# Patient Record
Sex: FEMALE | Race: WHITE | NOT HISPANIC OR LATINO | Employment: FULL TIME | URBAN - METROPOLITAN AREA
[De-identification: names, ages, dates, MRNs, and addresses within clinical notes are randomized per-mention and may not be internally consistent; named-entity substitution may affect disease eponyms.]

---

## 2019-07-22 ENCOUNTER — OFFICE VISIT (OUTPATIENT)
Dept: UROGYNECOLOGY | Facility: CLINIC | Age: 49
End: 2019-07-22
Payer: COMMERCIAL

## 2019-07-22 VITALS
DIASTOLIC BLOOD PRESSURE: 77 MMHG | HEART RATE: 74 BPM | WEIGHT: 173.31 LBS | BODY MASS INDEX: 27.85 KG/M2 | SYSTOLIC BLOOD PRESSURE: 114 MMHG | HEIGHT: 66 IN

## 2019-07-22 DIAGNOSIS — R35.0 URINARY FREQUENCY: Primary | ICD-10-CM

## 2019-07-22 DIAGNOSIS — T19.2XXA VAGINAL FOREIGN BODY, INITIAL ENCOUNTER: ICD-10-CM

## 2019-07-22 DIAGNOSIS — N39.42 URINARY INCONTINENCE WITHOUT SENSORY AWARENESS: ICD-10-CM

## 2019-07-22 DIAGNOSIS — N76.1 SUBACUTE VAGINITIS: ICD-10-CM

## 2019-07-22 LAB
BILIRUB SERPL-MCNC: NORMAL MG/DL
BLOOD URINE, POC: NORMAL
COLOR, POC UA: NORMAL
GLUCOSE UR QL STRIP: NORMAL
KETONES UR QL STRIP: NORMAL
LEUKOCYTE ESTERASE URINE, POC: NORMAL
NITRITE, POC UA: NORMAL
PH, POC UA: 7
PROTEIN, POC: NORMAL
SPECIFIC GRAVITY, POC UA: 1000
UROBILINOGEN, POC UA: NORMAL

## 2019-07-22 PROCEDURE — 99999 PR PBB SHADOW E&M-NEW PATIENT-LVL III: ICD-10-PCS | Mod: PBBFAC,,, | Performed by: OBSTETRICS & GYNECOLOGY

## 2019-07-22 PROCEDURE — 3008F BODY MASS INDEX DOCD: CPT | Mod: CPTII,S$GLB,, | Performed by: OBSTETRICS & GYNECOLOGY

## 2019-07-22 PROCEDURE — 3008F PR BODY MASS INDEX (BMI) DOCUMENTED: ICD-10-PCS | Mod: CPTII,S$GLB,, | Performed by: OBSTETRICS & GYNECOLOGY

## 2019-07-22 PROCEDURE — 81002 POCT URINE DIPSTICK WITHOUT MICROSCOPE: ICD-10-PCS | Mod: S$GLB,,, | Performed by: OBSTETRICS & GYNECOLOGY

## 2019-07-22 PROCEDURE — 99204 PR OFFICE/OUTPT VISIT, NEW, LEVL IV, 45-59 MIN: ICD-10-PCS | Mod: 25,S$GLB,, | Performed by: OBSTETRICS & GYNECOLOGY

## 2019-07-22 PROCEDURE — 99204 OFFICE O/P NEW MOD 45 MIN: CPT | Mod: 25,S$GLB,, | Performed by: OBSTETRICS & GYNECOLOGY

## 2019-07-22 PROCEDURE — 81002 URINALYSIS NONAUTO W/O SCOPE: CPT | Mod: S$GLB,,, | Performed by: OBSTETRICS & GYNECOLOGY

## 2019-07-22 PROCEDURE — 99999 PR PBB SHADOW E&M-NEW PATIENT-LVL III: CPT | Mod: PBBFAC,,, | Performed by: OBSTETRICS & GYNECOLOGY

## 2019-07-22 RX ORDER — CLOTRIMAZOLE AND BETAMETHASONE DIPROPIONATE 10; .64 MG/G; MG/G
CREAM TOPICAL
Refills: 0 | COMMUNITY
Start: 2019-05-25 | End: 2024-02-04

## 2019-07-22 RX ORDER — MELOXICAM 15 MG/1
TABLET ORAL
Refills: 0 | COMMUNITY
Start: 2019-05-30 | End: 2019-08-19

## 2019-07-22 RX ORDER — PREDNISONE 20 MG/1
TABLET ORAL
Refills: 0 | COMMUNITY
Start: 2019-05-30 | End: 2019-08-19

## 2019-07-22 RX ORDER — HYDROXYZINE HYDROCHLORIDE 25 MG/1
TABLET, FILM COATED ORAL
Refills: 0 | COMMUNITY
Start: 2019-05-30 | End: 2023-09-27

## 2019-07-22 NOTE — PROGRESS NOTES
Subjective:     Chief Complaint:  Odor, vaginal discharge, incontinence  History of Present Illness: Corinne Miller Wingerter is a 49 y.o. female who presents for incontinence fishy odor and discharge.  This is been going on for about 1 and half months.  She is changing pads about 3 times per day.  She describes more spontaneous incontinence and urge incontinence does not have any significant stress incontinence.  Her urinalysis today is normal.  She says she had incontinence during her last pregnancy and pressure on her bladder but it seemed to resolve post partially    Review of Systems    Constitutional: No acute distress. No weight gain/loss.  Eyes: No vision changes.  ENT: No headaches.   Respiratory: No SOB.  Cardiovascular: No chest pain. No edema. Hypertension  Gastrointestinal: No constipation. No diarrhea. No fecal incontinence.   Genitourinary: No vaginal bleeding or discharge.  Integument/Breast: Negative  Hematologic/Lymphatic: No history of anemia.  Musculoskeletal: No back pain. No abdominal pain.  Neurological: No disc problems. No paresthesias.  Behavioral/Psych: No history of depression.   Endocrine: No hormonal replacement.  Allergy/Immune: no recent reactions     Objective:   General Exam:  General appearance: WDNF. NAD.   HEENT: Rand. EOM's intact.  Neck: Normal thyroid.   Back: No CVA tenderness.  RESP: No SOB.  Breasts: deferred  Abdomen: Benign without localizing signs.  Extremities: No edema. No varices.  Lymphatic: noncontributory  Skin: No rashes. No lesions.  Neurologic: Intact.   Psych: Oriented.   Pelvic Exam:  V:  No lesions. No palpable nodes.   Va:  Malodorous grayish discharge.  Old tampon in posterior vault -removed with a ring forceps and Betadine applied  .Meatus:No caruncle or stenosis  Urethra: Non tender. No suburethral masses.  No significant hypermobility  Cx/Cuff:  Slightly inflamed  Uterus:  Mobile and nontender   Ad: No mass or tenderness.  Levators :Symmetrical. Normal  tone. Non tender.  BL: Non tender  RV: No hemorrhoids.  Assessment:   Retained tampon-symptoms a more of spontaneous incontinence and it is possible this is just a copious discharge from the tampon.  We will temporarily give her Myrbetriq and K she is developing overactive bladder       Plan:      Return for weeks or p.r.n..  If the discharge does not resolve within 24-48 hours she will call for a prescription for MetroGel

## 2019-08-19 ENCOUNTER — OFFICE VISIT (OUTPATIENT)
Dept: UROGYNECOLOGY | Facility: CLINIC | Age: 49
End: 2019-08-19
Payer: COMMERCIAL

## 2019-08-19 VITALS
WEIGHT: 169.75 LBS | HEIGHT: 66 IN | BODY MASS INDEX: 27.28 KG/M2 | SYSTOLIC BLOOD PRESSURE: 102 MMHG | DIASTOLIC BLOOD PRESSURE: 65 MMHG | HEART RATE: 65 BPM

## 2019-08-19 DIAGNOSIS — R35.0 URINARY FREQUENCY: Primary | ICD-10-CM

## 2019-08-19 LAB
BILIRUB SERPL-MCNC: NORMAL MG/DL
BLOOD URINE, POC: NORMAL
COLOR, POC UA: NORMAL
GLUCOSE UR QL STRIP: NORMAL
KETONES UR QL STRIP: NORMAL
LEUKOCYTE ESTERASE URINE, POC: NORMAL
NITRITE, POC UA: NORMAL
PH, POC UA: 7
PROTEIN, POC: NORMAL
SPECIFIC GRAVITY, POC UA: 1005
UROBILINOGEN, POC UA: NORMAL

## 2019-08-19 PROCEDURE — 99999 PR PBB SHADOW E&M-EST. PATIENT-LVL III: CPT | Mod: PBBFAC,,, | Performed by: OBSTETRICS & GYNECOLOGY

## 2019-08-19 PROCEDURE — 99213 OFFICE O/P EST LOW 20 MIN: CPT | Mod: PBBFAC,PO | Performed by: OBSTETRICS & GYNECOLOGY

## 2019-08-19 PROCEDURE — 99213 PR OFFICE/OUTPT VISIT, EST, LEVL III, 20-29 MIN: ICD-10-PCS | Mod: S$GLB,,, | Performed by: OBSTETRICS & GYNECOLOGY

## 2019-08-19 PROCEDURE — 99213 OFFICE O/P EST LOW 20 MIN: CPT | Mod: S$GLB,,, | Performed by: OBSTETRICS & GYNECOLOGY

## 2019-08-19 PROCEDURE — 81002 URINALYSIS NONAUTO W/O SCOPE: CPT | Mod: PBBFAC,PO | Performed by: OBSTETRICS & GYNECOLOGY

## 2019-08-19 PROCEDURE — 99999 PR PBB SHADOW E&M-EST. PATIENT-LVL III: ICD-10-PCS | Mod: PBBFAC,,, | Performed by: OBSTETRICS & GYNECOLOGY

## 2019-08-19 NOTE — PROGRESS NOTES
Subjective:     Chief Complaint:  Incontinence  History of Present Illness: Corinne Miller Wingerter is a 49 y.o. female who presents for follow-up of spontaneous and urge incontinence.  She had a retained tampon at the time.  She was placed on Myrbetriq and is having no further symptoms but she feels it was probably related to the tampon since it was fairly recent onset.  The malodorous discharge has discontinue.  She finished the Myrbetriq yesterday so she does not know yet how she feels off of it.    Review of Systems    Constitutional: No acute distress. No weight gain/loss.  Eyes: No vision changes.  ENT:  headaches.   Respiratory:  Nonsmoker  Cardiovascular: No chest pain. No edema.   Gastrointestinal: No constipation. No diarrhea. No fecal incontinence.   Genitourinary:  Pre menopausal  Integument/Breast: Negative  Hematologic/Lymphatic: No history of anemia.  Musculoskeletal:  Knee problems  Neurological: No disc problems. No paresthesias.  Behavioral/Psych: No history of depression.   Endocrine: No hormonal replacement.  Allergy/Immune: no recent reactions     Objective:   General Exam:  General appearance: WDNF. NAD.   HEENT: Rand. EOM's intact.  Neck: Normal thyroid.   Back: No CVA tenderness.  RESP: No SOB.  Breasts: deferred  Abdomen: Benign without localizing signs.  Extremities: No edema. No varices.  Lymphatic: noncontributory  Skin: No rashes. No lesions.  Neurologic: Intact.   Psych: Oriented.       Assessment:      Symptoms were likely secondary to the retained tampon-she is not presently having any incontinence    Plan:      She will go off of the Myrbetriq.  If her symptoms recur  she will call for a prescription

## 2019-10-25 ENCOUNTER — TELEPHONE (OUTPATIENT)
Dept: UROGYNECOLOGY | Facility: CLINIC | Age: 49
End: 2019-10-25

## 2019-10-25 NOTE — TELEPHONE ENCOUNTER
Spoke with pt, she was getting sample from dr posada and wanted to know if a rx could be called in by tuesday

## 2019-10-25 NOTE — TELEPHONE ENCOUNTER
----- Message from Nina Saravanan sent at 10/25/2019  3:20 PM CDT -----  Contact: pt  Type: Needs Medical Advice    Who Called:  pt    Best Call Back Number:   Additional Information: Requesting a call back from Nurse regarding a Rx being called in if pt bladder is leaking per ,please call with advice

## 2020-05-14 DIAGNOSIS — Z12.31 ENCOUNTER FOR SCREENING MAMMOGRAM FOR MALIGNANT NEOPLASM OF BREAST: Primary | ICD-10-CM

## 2020-05-18 ENCOUNTER — HOSPITAL ENCOUNTER (OUTPATIENT)
Dept: RADIOLOGY | Facility: HOSPITAL | Age: 50
Discharge: HOME | End: 2020-05-18
Attending: NURSE PRACTITIONER
Payer: COMMERCIAL

## 2020-05-18 VITALS — BODY MASS INDEX: 33.33 KG/M2 | HEIGHT: 60 IN | WEIGHT: 169.75 LBS

## 2020-05-18 DIAGNOSIS — Z12.31 ENCOUNTER FOR SCREENING MAMMOGRAM FOR MALIGNANT NEOPLASM OF BREAST: ICD-10-CM

## 2020-05-18 PROCEDURE — 77067 SCR MAMMO BI INCL CAD: CPT | Mod: TC,PO

## 2021-02-24 ENCOUNTER — OFFICE VISIT (OUTPATIENT)
Dept: URGENT CARE | Facility: CLINIC | Age: 51
End: 2021-02-24
Payer: COMMERCIAL

## 2021-02-24 VITALS
SYSTOLIC BLOOD PRESSURE: 122 MMHG | OXYGEN SATURATION: 99 % | TEMPERATURE: 99 F | DIASTOLIC BLOOD PRESSURE: 84 MMHG | HEART RATE: 100 BPM | RESPIRATION RATE: 20 BRPM

## 2021-02-24 DIAGNOSIS — R50.9 FEVER: ICD-10-CM

## 2021-02-24 DIAGNOSIS — M79.10 MUSCLE PAIN: Primary | ICD-10-CM

## 2021-02-24 DIAGNOSIS — R53.83 FATIGUE, UNSPECIFIED TYPE: ICD-10-CM

## 2021-02-24 PROCEDURE — 99204 OFFICE O/P NEW MOD 45 MIN: CPT | Mod: S$GLB,,, | Performed by: EMERGENCY MEDICINE

## 2021-02-24 PROCEDURE — U0003 INFECTIOUS AGENT DETECTION BY NUCLEIC ACID (DNA OR RNA); SEVERE ACUTE RESPIRATORY SYNDROME CORONAVIRUS 2 (SARS-COV-2) (CORONAVIRUS DISEASE [COVID-19]), AMPLIFIED PROBE TECHNIQUE, MAKING USE OF HIGH THROUGHPUT TECHNOLOGIES AS DESCRIBED BY CMS-2020-01-R: HCPCS

## 2021-02-24 PROCEDURE — 99204 PR OFFICE/OUTPT VISIT, NEW, LEVL IV, 45-59 MIN: ICD-10-PCS | Mod: S$GLB,,, | Performed by: EMERGENCY MEDICINE

## 2021-02-24 PROCEDURE — U0005 INFEC AGEN DETEC AMPLI PROBE: HCPCS

## 2021-02-25 DIAGNOSIS — U07.1 COVID-19 VIRUS DETECTED: ICD-10-CM

## 2021-02-25 LAB — SARS-COV-2 RNA RESP QL NAA+PROBE: DETECTED

## 2021-04-29 ENCOUNTER — PATIENT MESSAGE (OUTPATIENT)
Dept: RESEARCH | Facility: HOSPITAL | Age: 51
End: 2021-04-29

## 2021-07-22 ENCOUNTER — OFFICE VISIT (OUTPATIENT)
Dept: URGENT CARE | Facility: CLINIC | Age: 51
End: 2021-07-22
Payer: COMMERCIAL

## 2021-07-22 VITALS
TEMPERATURE: 98 F | DIASTOLIC BLOOD PRESSURE: 76 MMHG | RESPIRATION RATE: 16 BRPM | OXYGEN SATURATION: 98 % | HEART RATE: 61 BPM | SYSTOLIC BLOOD PRESSURE: 112 MMHG

## 2021-07-22 DIAGNOSIS — J02.9 SORE THROAT: ICD-10-CM

## 2021-07-22 DIAGNOSIS — Z20.822 COVID-19 VIRUS NOT DETECTED: Primary | ICD-10-CM

## 2021-07-22 LAB
CTP QC/QA: YES
SARS-COV-2 RDRP RESP QL NAA+PROBE: NEGATIVE

## 2021-07-22 PROCEDURE — U0002 COVID-19 LAB TEST NON-CDC: HCPCS | Mod: QW,S$GLB,, | Performed by: NURSE PRACTITIONER

## 2021-07-22 PROCEDURE — 99213 OFFICE O/P EST LOW 20 MIN: CPT | Mod: S$GLB,,, | Performed by: NURSE PRACTITIONER

## 2021-07-22 PROCEDURE — 99213 PR OFFICE/OUTPT VISIT, EST, LEVL III, 20-29 MIN: ICD-10-PCS | Mod: S$GLB,,, | Performed by: NURSE PRACTITIONER

## 2021-07-22 PROCEDURE — U0002: ICD-10-PCS | Mod: QW,S$GLB,, | Performed by: NURSE PRACTITIONER

## 2021-08-18 ENCOUNTER — HOSPITAL ENCOUNTER (OUTPATIENT)
Dept: RADIOLOGY | Facility: HOSPITAL | Age: 51
Discharge: HOME | End: 2021-08-18
Attending: PHYSICAL MEDICINE & REHABILITATION
Payer: COMMERCIAL

## 2021-08-18 ENCOUNTER — OFFICE VISIT (OUTPATIENT)
Dept: PHYSICAL MEDICINE AND REHAB | Facility: CLINIC | Age: 51
End: 2021-08-18
Payer: COMMERCIAL

## 2021-08-18 VITALS
HEIGHT: 60 IN | DIASTOLIC BLOOD PRESSURE: 77 MMHG | HEART RATE: 74 BPM | SYSTOLIC BLOOD PRESSURE: 127 MMHG | WEIGHT: 169 LBS | BODY MASS INDEX: 33.18 KG/M2

## 2021-08-18 DIAGNOSIS — R20.2 PARESTHESIAS: Primary | ICD-10-CM

## 2021-08-18 DIAGNOSIS — M54.9 DORSALGIA, UNSPECIFIED: ICD-10-CM

## 2021-08-18 DIAGNOSIS — R20.2 PARESTHESIAS: ICD-10-CM

## 2021-08-18 DIAGNOSIS — M25.559 HIP PAIN: ICD-10-CM

## 2021-08-18 PROCEDURE — 1125F AMNT PAIN NOTED PAIN PRSNT: CPT | Mod: CPTII,S$GLB,, | Performed by: PHYSICAL MEDICINE & REHABILITATION

## 2021-08-18 PROCEDURE — 73502 X-RAY EXAM HIP UNI 2-3 VIEWS: CPT | Mod: 26,RT,, | Performed by: RADIOLOGY

## 2021-08-18 PROCEDURE — 99204 PR OFFICE/OUTPT VISIT, NEW, LEVL IV, 45-59 MIN: ICD-10-PCS | Mod: S$GLB,,, | Performed by: PHYSICAL MEDICINE & REHABILITATION

## 2021-08-18 PROCEDURE — 72110 X-RAY EXAM L-2 SPINE 4/>VWS: CPT | Mod: 26,,, | Performed by: RADIOLOGY

## 2021-08-18 PROCEDURE — 99999 PR PBB SHADOW E&M-EST. PATIENT-LVL III: CPT | Mod: PBBFAC,,, | Performed by: PHYSICAL MEDICINE & REHABILITATION

## 2021-08-18 PROCEDURE — 99999 PR PBB SHADOW E&M-EST. PATIENT-LVL III: ICD-10-PCS | Mod: PBBFAC,,, | Performed by: PHYSICAL MEDICINE & REHABILITATION

## 2021-08-18 PROCEDURE — 72050 X-RAY EXAM NECK SPINE 4/5VWS: CPT | Mod: 26,,, | Performed by: RADIOLOGY

## 2021-08-18 PROCEDURE — 3008F PR BODY MASS INDEX (BMI) DOCUMENTED: ICD-10-PCS | Mod: CPTII,S$GLB,, | Performed by: PHYSICAL MEDICINE & REHABILITATION

## 2021-08-18 PROCEDURE — 73502 XR HIP WITH PELVIS WHEN PERFORMED, 2 OR 3  VIEWS RIGHT: ICD-10-PCS | Mod: 26,RT,, | Performed by: RADIOLOGY

## 2021-08-18 PROCEDURE — 3074F PR MOST RECENT SYSTOLIC BLOOD PRESSURE < 130 MM HG: ICD-10-PCS | Mod: CPTII,S$GLB,, | Performed by: PHYSICAL MEDICINE & REHABILITATION

## 2021-08-18 PROCEDURE — 73502 X-RAY EXAM HIP UNI 2-3 VIEWS: CPT | Mod: TC,FY,RT

## 2021-08-18 PROCEDURE — 72110 X-RAY EXAM L-2 SPINE 4/>VWS: CPT | Mod: TC,FY

## 2021-08-18 PROCEDURE — 3078F DIAST BP <80 MM HG: CPT | Mod: CPTII,S$GLB,, | Performed by: PHYSICAL MEDICINE & REHABILITATION

## 2021-08-18 PROCEDURE — 1159F PR MEDICATION LIST DOCUMENTED IN MEDICAL RECORD: ICD-10-PCS | Mod: CPTII,S$GLB,, | Performed by: PHYSICAL MEDICINE & REHABILITATION

## 2021-08-18 PROCEDURE — 3074F SYST BP LT 130 MM HG: CPT | Mod: CPTII,S$GLB,, | Performed by: PHYSICAL MEDICINE & REHABILITATION

## 2021-08-18 PROCEDURE — 3078F PR MOST RECENT DIASTOLIC BLOOD PRESSURE < 80 MM HG: ICD-10-PCS | Mod: CPTII,S$GLB,, | Performed by: PHYSICAL MEDICINE & REHABILITATION

## 2021-08-18 PROCEDURE — 3008F BODY MASS INDEX DOCD: CPT | Mod: CPTII,S$GLB,, | Performed by: PHYSICAL MEDICINE & REHABILITATION

## 2021-08-18 PROCEDURE — 72110 XR LUMBAR SPINE COMPLETE 5 VIEW: ICD-10-PCS | Mod: 26,,, | Performed by: RADIOLOGY

## 2021-08-18 PROCEDURE — 72050 X-RAY EXAM NECK SPINE 4/5VWS: CPT | Mod: TC,FY

## 2021-08-18 PROCEDURE — 1159F MED LIST DOCD IN RCRD: CPT | Mod: CPTII,S$GLB,, | Performed by: PHYSICAL MEDICINE & REHABILITATION

## 2021-08-18 PROCEDURE — 1125F PR PAIN SEVERITY QUANTIFIED, PAIN PRESENT: ICD-10-PCS | Mod: CPTII,S$GLB,, | Performed by: PHYSICAL MEDICINE & REHABILITATION

## 2021-08-18 PROCEDURE — 72050 XR CERVICAL SPINE COMPLETE 5 VIEW: ICD-10-PCS | Mod: 26,,, | Performed by: RADIOLOGY

## 2021-08-18 PROCEDURE — 99204 OFFICE O/P NEW MOD 45 MIN: CPT | Mod: S$GLB,,, | Performed by: PHYSICAL MEDICINE & REHABILITATION

## 2021-08-19 ENCOUNTER — PATIENT MESSAGE (OUTPATIENT)
Dept: PHYSICAL MEDICINE AND REHAB | Facility: CLINIC | Age: 51
End: 2021-08-19

## 2021-09-17 ENCOUNTER — OFFICE VISIT (OUTPATIENT)
Dept: PHYSICAL MEDICINE AND REHAB | Facility: CLINIC | Age: 51
End: 2021-09-17
Payer: COMMERCIAL

## 2021-09-17 ENCOUNTER — LAB VISIT (OUTPATIENT)
Dept: LAB | Facility: HOSPITAL | Age: 51
End: 2021-09-17
Attending: PHYSICAL MEDICINE & REHABILITATION
Payer: COMMERCIAL

## 2021-09-17 VITALS
BODY MASS INDEX: 27.16 KG/M2 | WEIGHT: 169 LBS | SYSTOLIC BLOOD PRESSURE: 113 MMHG | HEIGHT: 66 IN | HEART RATE: 78 BPM | DIASTOLIC BLOOD PRESSURE: 71 MMHG

## 2021-09-17 DIAGNOSIS — M79.10 MYALGIA: ICD-10-CM

## 2021-09-17 DIAGNOSIS — R20.2 PARESTHESIAS: ICD-10-CM

## 2021-09-17 DIAGNOSIS — M25.559 HIP PAIN: ICD-10-CM

## 2021-09-17 DIAGNOSIS — G62.9 NEUROPATHY: ICD-10-CM

## 2021-09-17 DIAGNOSIS — M79.10 MYALGIA: Primary | ICD-10-CM

## 2021-09-17 DIAGNOSIS — Z01.818 PRE-OP TESTING: Primary | ICD-10-CM

## 2021-09-17 LAB
ALBUMIN SERPL BCP-MCNC: 4.4 G/DL (ref 3.5–5.2)
ALP SERPL-CCNC: 48 U/L (ref 55–135)
ALT SERPL W/O P-5'-P-CCNC: 32 U/L (ref 10–44)
ANION GAP SERPL CALC-SCNC: 11 MMOL/L (ref 8–16)
AST SERPL-CCNC: 26 U/L (ref 10–40)
BASOPHILS # BLD AUTO: 0.02 K/UL (ref 0–0.2)
BASOPHILS NFR BLD: 0.3 % (ref 0–1.9)
BILIRUB SERPL-MCNC: 0.6 MG/DL (ref 0.1–1)
BUN SERPL-MCNC: 24 MG/DL (ref 6–20)
CALCIUM SERPL-MCNC: 10 MG/DL (ref 8.7–10.5)
CHLORIDE SERPL-SCNC: 104 MMOL/L (ref 95–110)
CO2 SERPL-SCNC: 24 MMOL/L (ref 23–29)
CREAT SERPL-MCNC: 0.9 MG/DL (ref 0.5–1.4)
DIFFERENTIAL METHOD: ABNORMAL
EOSINOPHIL # BLD AUTO: 0.1 K/UL (ref 0–0.5)
EOSINOPHIL NFR BLD: 0.8 % (ref 0–8)
ERYTHROCYTE [DISTWIDTH] IN BLOOD BY AUTOMATED COUNT: 11.9 % (ref 11.5–14.5)
EST. GFR  (AFRICAN AMERICAN): >60 ML/MIN/1.73 M^2
EST. GFR  (NON AFRICAN AMERICAN): >60 ML/MIN/1.73 M^2
FERRITIN SERPL-MCNC: 90 NG/ML (ref 20–300)
GLUCOSE SERPL-MCNC: 90 MG/DL (ref 70–110)
HCT VFR BLD AUTO: 39.9 % (ref 37–48.5)
HGB BLD-MCNC: 13.1 G/DL (ref 12–16)
IMM GRANULOCYTES # BLD AUTO: 0.01 K/UL (ref 0–0.04)
IMM GRANULOCYTES NFR BLD AUTO: 0.1 % (ref 0–0.5)
LYMPHOCYTES # BLD AUTO: 1.3 K/UL (ref 1–4.8)
LYMPHOCYTES NFR BLD: 18.8 % (ref 18–48)
MCH RBC QN AUTO: 32.1 PG (ref 27–31)
MCHC RBC AUTO-ENTMCNC: 32.8 G/DL (ref 32–36)
MCV RBC AUTO: 98 FL (ref 82–98)
MONOCYTES # BLD AUTO: 0.6 K/UL (ref 0.3–1)
MONOCYTES NFR BLD: 7.8 % (ref 4–15)
NEUTROPHILS # BLD AUTO: 5.1 K/UL (ref 1.8–7.7)
NEUTROPHILS NFR BLD: 72.2 % (ref 38–73)
NRBC BLD-RTO: 0 /100 WBC
PLATELET # BLD AUTO: 213 K/UL (ref 150–450)
PMV BLD AUTO: 9.9 FL (ref 9.2–12.9)
POTASSIUM SERPL-SCNC: 4.4 MMOL/L (ref 3.5–5.1)
PROT SERPL-MCNC: 7.5 G/DL (ref 6–8.4)
RBC # BLD AUTO: 4.08 M/UL (ref 4–5.4)
SODIUM SERPL-SCNC: 139 MMOL/L (ref 136–145)
T4 FREE SERPL-MCNC: 0.94 NG/DL (ref 0.71–1.51)
TSH SERPL DL<=0.005 MIU/L-ACNC: 1.11 UIU/ML (ref 0.4–4)
WBC # BLD AUTO: 7.06 K/UL (ref 3.9–12.7)

## 2021-09-17 PROCEDURE — 3078F DIAST BP <80 MM HG: CPT | Mod: CPTII,S$GLB,, | Performed by: PHYSICAL MEDICINE & REHABILITATION

## 2021-09-17 PROCEDURE — 3008F BODY MASS INDEX DOCD: CPT | Mod: CPTII,S$GLB,, | Performed by: PHYSICAL MEDICINE & REHABILITATION

## 2021-09-17 PROCEDURE — 85025 COMPLETE CBC W/AUTO DIFF WBC: CPT | Performed by: PHYSICAL MEDICINE & REHABILITATION

## 2021-09-17 PROCEDURE — 99499 UNLISTED E&M SERVICE: CPT | Mod: S$GLB,,, | Performed by: PHYSICAL MEDICINE & REHABILITATION

## 2021-09-17 PROCEDURE — 99999 PR PBB SHADOW E&M-EST. PATIENT-LVL III: ICD-10-PCS | Mod: PBBFAC,,, | Performed by: PHYSICAL MEDICINE & REHABILITATION

## 2021-09-17 PROCEDURE — 3074F PR MOST RECENT SYSTOLIC BLOOD PRESSURE < 130 MM HG: ICD-10-PCS | Mod: CPTII,S$GLB,, | Performed by: PHYSICAL MEDICINE & REHABILITATION

## 2021-09-17 PROCEDURE — 76882 PR  US,EXTREMITY,NONVASCULAR,REAL-TIME IMAGE,LIMITED: ICD-10-PCS | Mod: S$GLB,,, | Performed by: PHYSICAL MEDICINE & REHABILITATION

## 2021-09-17 PROCEDURE — 3074F SYST BP LT 130 MM HG: CPT | Mod: CPTII,S$GLB,, | Performed by: PHYSICAL MEDICINE & REHABILITATION

## 2021-09-17 PROCEDURE — 82728 ASSAY OF FERRITIN: CPT | Performed by: PHYSICAL MEDICINE & REHABILITATION

## 2021-09-17 PROCEDURE — 1159F MED LIST DOCD IN RCRD: CPT | Mod: CPTII,S$GLB,, | Performed by: PHYSICAL MEDICINE & REHABILITATION

## 2021-09-17 PROCEDURE — 36415 COLL VENOUS BLD VENIPUNCTURE: CPT | Performed by: PHYSICAL MEDICINE & REHABILITATION

## 2021-09-17 PROCEDURE — 86376 MICROSOMAL ANTIBODY EACH: CPT | Performed by: PHYSICAL MEDICINE & REHABILITATION

## 2021-09-17 PROCEDURE — 95886 MUSC TEST DONE W/N TEST COMP: CPT | Mod: S$GLB,,, | Performed by: PHYSICAL MEDICINE & REHABILITATION

## 2021-09-17 PROCEDURE — 99999 PR PBB SHADOW E&M-EST. PATIENT-LVL III: CPT | Mod: PBBFAC,,, | Performed by: PHYSICAL MEDICINE & REHABILITATION

## 2021-09-17 PROCEDURE — 3078F PR MOST RECENT DIASTOLIC BLOOD PRESSURE < 80 MM HG: ICD-10-PCS | Mod: CPTII,S$GLB,, | Performed by: PHYSICAL MEDICINE & REHABILITATION

## 2021-09-17 PROCEDURE — 99999 PR PBB SHADOW E&M-EST. PATIENT-LVL I: ICD-10-PCS | Mod: PBBFAC,,, | Performed by: PHYSICAL MEDICINE & REHABILITATION

## 2021-09-17 PROCEDURE — 3044F PR MOST RECENT HEMOGLOBIN A1C LEVEL <7.0%: ICD-10-PCS | Mod: CPTII,S$GLB,, | Performed by: PHYSICAL MEDICINE & REHABILITATION

## 2021-09-17 PROCEDURE — 76882 US LMTD JT/FCL EVL NVASC XTR: CPT | Mod: S$GLB,,, | Performed by: PHYSICAL MEDICINE & REHABILITATION

## 2021-09-17 PROCEDURE — 99214 PR OFFICE/OUTPT VISIT, EST, LEVL IV, 30-39 MIN: ICD-10-PCS | Mod: S$GLB,,, | Performed by: PHYSICAL MEDICINE & REHABILITATION

## 2021-09-17 PROCEDURE — 1159F PR MEDICATION LIST DOCUMENTED IN MEDICAL RECORD: ICD-10-PCS | Mod: CPTII,S$GLB,, | Performed by: PHYSICAL MEDICINE & REHABILITATION

## 2021-09-17 PROCEDURE — 84439 ASSAY OF FREE THYROXINE: CPT | Performed by: PHYSICAL MEDICINE & REHABILITATION

## 2021-09-17 PROCEDURE — 95886 PR EMG COMPLETE, W/ NERVE CONDUCTION STUDIES, 5+ MUSCLES: ICD-10-PCS | Mod: S$GLB,,, | Performed by: PHYSICAL MEDICINE & REHABILITATION

## 2021-09-17 PROCEDURE — 80053 COMPREHEN METABOLIC PANEL: CPT | Performed by: PHYSICAL MEDICINE & REHABILITATION

## 2021-09-17 PROCEDURE — 84443 ASSAY THYROID STIM HORMONE: CPT | Performed by: PHYSICAL MEDICINE & REHABILITATION

## 2021-09-17 PROCEDURE — 3008F PR BODY MASS INDEX (BMI) DOCUMENTED: ICD-10-PCS | Mod: CPTII,S$GLB,, | Performed by: PHYSICAL MEDICINE & REHABILITATION

## 2021-09-17 PROCEDURE — 99499 NO LOS: ICD-10-PCS | Mod: S$GLB,,, | Performed by: PHYSICAL MEDICINE & REHABILITATION

## 2021-09-17 PROCEDURE — 84480 ASSAY TRIIODOTHYRONINE (T3): CPT | Performed by: PHYSICAL MEDICINE & REHABILITATION

## 2021-09-17 PROCEDURE — 82378 CARCINOEMBRYONIC ANTIGEN: CPT | Performed by: PHYSICAL MEDICINE & REHABILITATION

## 2021-09-17 PROCEDURE — 95913 NRV CNDJ TEST 13/> STUDIES: CPT | Mod: S$GLB,,, | Performed by: PHYSICAL MEDICINE & REHABILITATION

## 2021-09-17 PROCEDURE — 99214 OFFICE O/P EST MOD 30 MIN: CPT | Mod: S$GLB,,, | Performed by: PHYSICAL MEDICINE & REHABILITATION

## 2021-09-17 PROCEDURE — 95913 PR NERVE CONDUCTION STUDY; 13 OR MORE STUDIES: ICD-10-PCS | Mod: S$GLB,,, | Performed by: PHYSICAL MEDICINE & REHABILITATION

## 2021-09-17 PROCEDURE — 99999 PR PBB SHADOW E&M-EST. PATIENT-LVL I: CPT | Mod: PBBFAC,,, | Performed by: PHYSICAL MEDICINE & REHABILITATION

## 2021-09-17 PROCEDURE — 3044F HG A1C LEVEL LT 7.0%: CPT | Mod: CPTII,S$GLB,, | Performed by: PHYSICAL MEDICINE & REHABILITATION

## 2021-09-17 PROCEDURE — 84466 ASSAY OF TRANSFERRIN: CPT | Performed by: PHYSICAL MEDICINE & REHABILITATION

## 2021-09-17 RX ORDER — ALPRAZOLAM 0.5 MG/1
0.5 TABLET, ORALLY DISINTEGRATING ORAL ONCE AS NEEDED
Status: CANCELLED | OUTPATIENT
Start: 2021-09-17 | End: 2033-02-13

## 2021-09-17 RX ORDER — LIDOCAINE HYDROCHLORIDE 10 MG/ML
1 INJECTION, SOLUTION EPIDURAL; INFILTRATION; INTRACAUDAL; PERINEURAL ONCE
Status: CANCELLED | OUTPATIENT
Start: 2021-09-17 | End: 2021-09-17

## 2021-09-18 LAB
CEA SERPL-MCNC: 2.5 NG/ML (ref 0–5)
IRON SERPL-MCNC: 108 UG/DL (ref 30–160)
SATURATED IRON: 29 % (ref 20–50)
T3 SERPL-MCNC: 106 NG/DL (ref 60–180)
THYROPEROXIDASE IGG SERPL-ACNC: <6 IU/ML
TOTAL IRON BINDING CAPACITY: 374 UG/DL (ref 250–450)
TRANSFERRIN SERPL-MCNC: 253 MG/DL (ref 200–375)

## 2021-09-26 ENCOUNTER — LAB VISIT (OUTPATIENT)
Dept: PRIMARY CARE CLINIC | Facility: CLINIC | Age: 51
End: 2021-09-26
Payer: COMMERCIAL

## 2021-09-26 DIAGNOSIS — Z01.818 PRE-OP TESTING: ICD-10-CM

## 2021-09-26 PROCEDURE — U0005 INFEC AGEN DETEC AMPLI PROBE: HCPCS | Performed by: PHYSICAL MEDICINE & REHABILITATION

## 2021-09-26 PROCEDURE — U0003 INFECTIOUS AGENT DETECTION BY NUCLEIC ACID (DNA OR RNA); SEVERE ACUTE RESPIRATORY SYNDROME CORONAVIRUS 2 (SARS-COV-2) (CORONAVIRUS DISEASE [COVID-19]), AMPLIFIED PROBE TECHNIQUE, MAKING USE OF HIGH THROUGHPUT TECHNOLOGIES AS DESCRIBED BY CMS-2020-01-R: HCPCS | Performed by: PHYSICAL MEDICINE & REHABILITATION

## 2021-09-27 LAB
SARS-COV-2 RNA RESP QL NAA+PROBE: NOT DETECTED
SARS-COV-2- CYCLE NUMBER: NORMAL

## 2021-09-27 RX ORDER — ALPRAZOLAM 0.5 MG/1
0.5 TABLET, ORALLY DISINTEGRATING ORAL ONCE AS NEEDED
Status: DISCONTINUED | OUTPATIENT
Start: 2021-09-27 | End: 2021-09-27

## 2021-09-29 ENCOUNTER — HOSPITAL ENCOUNTER (OUTPATIENT)
Facility: AMBULARY SURGERY CENTER | Age: 51
Discharge: HOME | End: 2021-09-29
Attending: PHYSICAL MEDICINE & REHABILITATION | Admitting: PHYSICAL MEDICINE & REHABILITATION
Payer: COMMERCIAL

## 2021-09-29 DIAGNOSIS — M25.559 HIP PAIN: Primary | ICD-10-CM

## 2021-09-29 DIAGNOSIS — M79.10 MYALGIA: ICD-10-CM

## 2021-09-29 DIAGNOSIS — G62.9 NEUROPATHY: ICD-10-CM

## 2021-09-29 PROCEDURE — 27000 TENOTOMY ADDUCTOR HIP PERQ: CPT | Performed by: PHYSICAL MEDICINE & REHABILITATION

## 2021-09-29 PROCEDURE — 27000 TENOTOMY ADDUCTOR HIP PERQ: CPT | Mod: RT,,, | Performed by: PHYSICAL MEDICINE & REHABILITATION

## 2021-09-29 PROCEDURE — 27000 PR INCIS HIP ADDUCTOR,SUBCUT,CLOSED: ICD-10-PCS | Mod: RT,,, | Performed by: PHYSICAL MEDICINE & REHABILITATION

## 2021-09-29 RX ORDER — LIDOCAINE HYDROCHLORIDE 10 MG/ML
INJECTION, SOLUTION EPIDURAL; INFILTRATION; INTRACAUDAL; PERINEURAL
Status: DISCONTINUED | OUTPATIENT
Start: 2021-09-29 | End: 2021-09-29 | Stop reason: HOSPADM

## 2021-09-29 RX ORDER — BUPIVACAINE HYDROCHLORIDE 5 MG/ML
INJECTION, SOLUTION EPIDURAL; INTRACAUDAL
Status: DISCONTINUED
Start: 2021-09-29 | End: 2021-09-29 | Stop reason: HOSPADM

## 2021-09-29 RX ORDER — CEFAZOLIN SODIUM 1 G/3ML
1 INJECTION, POWDER, FOR SOLUTION INTRAMUSCULAR; INTRAVENOUS
Status: COMPLETED | OUTPATIENT
Start: 2021-09-29 | End: 2021-09-29

## 2021-09-29 RX ORDER — LIDOCAINE HYDROCHLORIDE 10 MG/ML
1 INJECTION, SOLUTION EPIDURAL; INFILTRATION; INTRACAUDAL; PERINEURAL ONCE
Status: DISCONTINUED | OUTPATIENT
Start: 2021-09-29 | End: 2021-09-29 | Stop reason: HOSPADM

## 2021-09-29 RX ORDER — LIDOCAINE AND PRILOCAINE 25; 25 MG/G; MG/G
CREAM TOPICAL ONCE
Status: COMPLETED | OUTPATIENT
Start: 2021-09-29 | End: 2021-09-29

## 2021-09-29 RX ORDER — ALPRAZOLAM 1 MG/1
1 TABLET, ORALLY DISINTEGRATING ORAL
Status: COMPLETED | OUTPATIENT
Start: 2021-09-29 | End: 2021-09-29

## 2021-09-29 RX ORDER — LIDOCAINE HYDROCHLORIDE 10 MG/ML
INJECTION, SOLUTION EPIDURAL; INFILTRATION; INTRACAUDAL; PERINEURAL
Status: DISCONTINUED
Start: 2021-09-29 | End: 2021-09-29 | Stop reason: HOSPADM

## 2021-09-29 RX ORDER — BUPIVACAINE HYDROCHLORIDE 5 MG/ML
INJECTION, SOLUTION EPIDURAL; INTRACAUDAL
Status: DISCONTINUED | OUTPATIENT
Start: 2021-09-29 | End: 2021-09-29 | Stop reason: HOSPADM

## 2021-09-29 RX ORDER — SODIUM CHLORIDE 9 MG/ML
INJECTION, SOLUTION INTRAMUSCULAR; INTRAVENOUS; SUBCUTANEOUS
Status: DISCONTINUED | OUTPATIENT
Start: 2021-09-29 | End: 2021-09-29 | Stop reason: HOSPADM

## 2021-09-29 RX ADMIN — LIDOCAINE AND PRILOCAINE: 25; 25 CREAM TOPICAL at 11:09

## 2021-09-29 RX ADMIN — CEFAZOLIN SODIUM 1 G: 1 INJECTION, POWDER, FOR SOLUTION INTRAMUSCULAR; INTRAVENOUS at 11:09

## 2021-09-29 RX ADMIN — ALPRAZOLAM 1 MG: 1 TABLET, ORALLY DISINTEGRATING ORAL at 11:09

## 2021-09-30 ENCOUNTER — TELEPHONE (OUTPATIENT)
Dept: PHYSICAL MEDICINE AND REHAB | Facility: CLINIC | Age: 51
End: 2021-09-30

## 2021-09-30 VITALS
BODY MASS INDEX: 27.29 KG/M2 | DIASTOLIC BLOOD PRESSURE: 82 MMHG | SYSTOLIC BLOOD PRESSURE: 140 MMHG | TEMPERATURE: 97 F | OXYGEN SATURATION: 99 % | HEART RATE: 64 BPM | WEIGHT: 169.06 LBS | RESPIRATION RATE: 20 BRPM

## 2021-10-15 ENCOUNTER — OFFICE VISIT (OUTPATIENT)
Dept: PHYSICAL MEDICINE AND REHAB | Facility: CLINIC | Age: 51
End: 2021-10-15
Payer: COMMERCIAL

## 2021-10-15 VITALS
DIASTOLIC BLOOD PRESSURE: 80 MMHG | SYSTOLIC BLOOD PRESSURE: 132 MMHG | WEIGHT: 169 LBS | BODY MASS INDEX: 27.16 KG/M2 | HEART RATE: 70 BPM | HEIGHT: 66 IN

## 2021-10-15 DIAGNOSIS — M25.559 HIP PAIN: Primary | ICD-10-CM

## 2021-10-15 PROCEDURE — 99499 NO LOS: ICD-10-PCS | Mod: S$GLB,,, | Performed by: PHYSICAL MEDICINE & REHABILITATION

## 2021-10-15 PROCEDURE — 3075F SYST BP GE 130 - 139MM HG: CPT | Mod: CPTII,S$GLB,, | Performed by: PHYSICAL MEDICINE & REHABILITATION

## 2021-10-15 PROCEDURE — 99999 PR PBB SHADOW E&M-EST. PATIENT-LVL III: CPT | Mod: PBBFAC,,, | Performed by: PHYSICAL MEDICINE & REHABILITATION

## 2021-10-15 PROCEDURE — 3008F BODY MASS INDEX DOCD: CPT | Mod: CPTII,S$GLB,, | Performed by: PHYSICAL MEDICINE & REHABILITATION

## 2021-10-15 PROCEDURE — 1159F PR MEDICATION LIST DOCUMENTED IN MEDICAL RECORD: ICD-10-PCS | Mod: CPTII,S$GLB,, | Performed by: PHYSICAL MEDICINE & REHABILITATION

## 2021-10-15 PROCEDURE — 3008F PR BODY MASS INDEX (BMI) DOCUMENTED: ICD-10-PCS | Mod: CPTII,S$GLB,, | Performed by: PHYSICAL MEDICINE & REHABILITATION

## 2021-10-15 PROCEDURE — 3079F PR MOST RECENT DIASTOLIC BLOOD PRESSURE 80-89 MM HG: ICD-10-PCS | Mod: CPTII,S$GLB,, | Performed by: PHYSICAL MEDICINE & REHABILITATION

## 2021-10-15 PROCEDURE — 3075F PR MOST RECENT SYSTOLIC BLOOD PRESS GE 130-139MM HG: ICD-10-PCS | Mod: CPTII,S$GLB,, | Performed by: PHYSICAL MEDICINE & REHABILITATION

## 2021-10-15 PROCEDURE — 99999 PR PBB SHADOW E&M-EST. PATIENT-LVL III: ICD-10-PCS | Mod: PBBFAC,,, | Performed by: PHYSICAL MEDICINE & REHABILITATION

## 2021-10-15 PROCEDURE — 3044F PR MOST RECENT HEMOGLOBIN A1C LEVEL <7.0%: ICD-10-PCS | Mod: CPTII,S$GLB,, | Performed by: PHYSICAL MEDICINE & REHABILITATION

## 2021-10-15 PROCEDURE — 99499 UNLISTED E&M SERVICE: CPT | Mod: S$GLB,,, | Performed by: PHYSICAL MEDICINE & REHABILITATION

## 2021-10-15 PROCEDURE — 1159F MED LIST DOCD IN RCRD: CPT | Mod: CPTII,S$GLB,, | Performed by: PHYSICAL MEDICINE & REHABILITATION

## 2021-10-15 PROCEDURE — 3079F DIAST BP 80-89 MM HG: CPT | Mod: CPTII,S$GLB,, | Performed by: PHYSICAL MEDICINE & REHABILITATION

## 2021-10-15 PROCEDURE — 3044F HG A1C LEVEL LT 7.0%: CPT | Mod: CPTII,S$GLB,, | Performed by: PHYSICAL MEDICINE & REHABILITATION

## 2021-11-12 ENCOUNTER — OFFICE VISIT (OUTPATIENT)
Dept: PHYSICAL MEDICINE AND REHAB | Facility: CLINIC | Age: 51
End: 2021-11-12
Payer: COMMERCIAL

## 2021-11-12 VITALS
HEIGHT: 66 IN | WEIGHT: 169 LBS | DIASTOLIC BLOOD PRESSURE: 79 MMHG | SYSTOLIC BLOOD PRESSURE: 119 MMHG | BODY MASS INDEX: 27.16 KG/M2 | HEART RATE: 69 BPM

## 2021-11-12 DIAGNOSIS — M25.559 HIP PAIN: Primary | ICD-10-CM

## 2021-11-12 PROCEDURE — 3078F PR MOST RECENT DIASTOLIC BLOOD PRESSURE < 80 MM HG: ICD-10-PCS | Mod: CPTII,S$GLB,, | Performed by: PHYSICAL MEDICINE & REHABILITATION

## 2021-11-12 PROCEDURE — 1159F PR MEDICATION LIST DOCUMENTED IN MEDICAL RECORD: ICD-10-PCS | Mod: CPTII,S$GLB,, | Performed by: PHYSICAL MEDICINE & REHABILITATION

## 2021-11-12 PROCEDURE — 3008F PR BODY MASS INDEX (BMI) DOCUMENTED: ICD-10-PCS | Mod: CPTII,S$GLB,, | Performed by: PHYSICAL MEDICINE & REHABILITATION

## 2021-11-12 PROCEDURE — 99213 OFFICE O/P EST LOW 20 MIN: CPT | Mod: S$GLB,,, | Performed by: PHYSICAL MEDICINE & REHABILITATION

## 2021-11-12 PROCEDURE — 99999 PR PBB SHADOW E&M-EST. PATIENT-LVL III: CPT | Mod: PBBFAC,,, | Performed by: PHYSICAL MEDICINE & REHABILITATION

## 2021-11-12 PROCEDURE — 3074F PR MOST RECENT SYSTOLIC BLOOD PRESSURE < 130 MM HG: ICD-10-PCS | Mod: CPTII,S$GLB,, | Performed by: PHYSICAL MEDICINE & REHABILITATION

## 2021-11-12 PROCEDURE — 3078F DIAST BP <80 MM HG: CPT | Mod: CPTII,S$GLB,, | Performed by: PHYSICAL MEDICINE & REHABILITATION

## 2021-11-12 PROCEDURE — 3044F PR MOST RECENT HEMOGLOBIN A1C LEVEL <7.0%: ICD-10-PCS | Mod: CPTII,S$GLB,, | Performed by: PHYSICAL MEDICINE & REHABILITATION

## 2021-11-12 PROCEDURE — 3008F BODY MASS INDEX DOCD: CPT | Mod: CPTII,S$GLB,, | Performed by: PHYSICAL MEDICINE & REHABILITATION

## 2021-11-12 PROCEDURE — 1159F MED LIST DOCD IN RCRD: CPT | Mod: CPTII,S$GLB,, | Performed by: PHYSICAL MEDICINE & REHABILITATION

## 2021-11-12 PROCEDURE — 3074F SYST BP LT 130 MM HG: CPT | Mod: CPTII,S$GLB,, | Performed by: PHYSICAL MEDICINE & REHABILITATION

## 2021-11-12 PROCEDURE — 3044F HG A1C LEVEL LT 7.0%: CPT | Mod: CPTII,S$GLB,, | Performed by: PHYSICAL MEDICINE & REHABILITATION

## 2021-11-12 PROCEDURE — 99999 PR PBB SHADOW E&M-EST. PATIENT-LVL III: ICD-10-PCS | Mod: PBBFAC,,, | Performed by: PHYSICAL MEDICINE & REHABILITATION

## 2021-11-12 PROCEDURE — 99213 PR OFFICE/OUTPT VISIT, EST, LEVL III, 20-29 MIN: ICD-10-PCS | Mod: S$GLB,,, | Performed by: PHYSICAL MEDICINE & REHABILITATION

## 2021-11-12 RX ORDER — METHYLPREDNISOLONE 4 MG/1
TABLET ORAL
Qty: 21 EACH | Refills: 0 | Status: SHIPPED | OUTPATIENT
Start: 2021-11-12 | End: 2021-12-03

## 2022-06-27 ENCOUNTER — OFFICE VISIT (OUTPATIENT)
Dept: ORTHOPEDICS | Facility: CLINIC | Age: 52
End: 2022-06-27
Payer: COMMERCIAL

## 2022-06-27 VITALS — HEIGHT: 66 IN | BODY MASS INDEX: 35.36 KG/M2 | WEIGHT: 220 LBS

## 2022-06-27 DIAGNOSIS — M51.36 DISC DEGENERATION, LUMBAR: ICD-10-CM

## 2022-06-27 DIAGNOSIS — M47.816 FACET ARTHRITIS OF LUMBAR REGION: ICD-10-CM

## 2022-06-27 DIAGNOSIS — M54.16 LUMBAR RADICULOPATHY, CHRONIC: ICD-10-CM

## 2022-06-27 DIAGNOSIS — G56.01 CARPAL TUNNEL SYNDROME ON RIGHT: ICD-10-CM

## 2022-06-27 DIAGNOSIS — G56.02 CARPAL TUNNEL SYNDROME ON LEFT: Primary | ICD-10-CM

## 2022-06-27 DIAGNOSIS — M46.02 SPINAL ENTHESOPATHY, CERVICAL REGION: ICD-10-CM

## 2022-06-27 PROCEDURE — 3008F PR BODY MASS INDEX (BMI) DOCUMENTED: ICD-10-PCS | Mod: CPTII,S$GLB,, | Performed by: ORTHOPAEDIC SURGERY

## 2022-06-27 PROCEDURE — 20552 NJX 1/MLT TRIGGER POINT 1/2: CPT | Mod: S$GLB,,, | Performed by: ORTHOPAEDIC SURGERY

## 2022-06-27 PROCEDURE — 1159F MED LIST DOCD IN RCRD: CPT | Mod: CPTII,S$GLB,, | Performed by: ORTHOPAEDIC SURGERY

## 2022-06-27 PROCEDURE — 1159F PR MEDICATION LIST DOCUMENTED IN MEDICAL RECORD: ICD-10-PCS | Mod: CPTII,S$GLB,, | Performed by: ORTHOPAEDIC SURGERY

## 2022-06-27 PROCEDURE — 3008F BODY MASS INDEX DOCD: CPT | Mod: CPTII,S$GLB,, | Performed by: ORTHOPAEDIC SURGERY

## 2022-06-27 PROCEDURE — 99204 PR OFFICE/OUTPT VISIT, NEW, LEVL IV, 45-59 MIN: ICD-10-PCS | Mod: 25,S$GLB,, | Performed by: ORTHOPAEDIC SURGERY

## 2022-06-27 PROCEDURE — 1160F PR REVIEW ALL MEDS BY PRESCRIBER/CLIN PHARMACIST DOCUMENTED: ICD-10-PCS | Mod: CPTII,S$GLB,, | Performed by: ORTHOPAEDIC SURGERY

## 2022-06-27 PROCEDURE — 99204 OFFICE O/P NEW MOD 45 MIN: CPT | Mod: 25,S$GLB,, | Performed by: ORTHOPAEDIC SURGERY

## 2022-06-27 PROCEDURE — 1160F RVW MEDS BY RX/DR IN RCRD: CPT | Mod: CPTII,S$GLB,, | Performed by: ORTHOPAEDIC SURGERY

## 2022-06-27 PROCEDURE — 20552 TRIGGER POINT INJECTION: ICD-10-PCS | Mod: S$GLB,,, | Performed by: ORTHOPAEDIC SURGERY

## 2022-06-27 RX ORDER — TRIAMCINOLONE ACETONIDE 40 MG/ML
40 INJECTION, SUSPENSION INTRA-ARTICULAR; INTRAMUSCULAR
Status: DISCONTINUED | OUTPATIENT
Start: 2022-06-27 | End: 2022-06-27 | Stop reason: HOSPADM

## 2022-06-27 RX ADMIN — TRIAMCINOLONE ACETONIDE 40 MG: 40 INJECTION, SUSPENSION INTRA-ARTICULAR; INTRAMUSCULAR at 09:06

## 2022-06-27 NOTE — LETTER
June 27, 2022      [facility]  [address]  [place] [address]  Phone: [phone]  Fax: [phone]       Patient: Corinne Corinne Wingerter   YOB: 1970  Date of Visit: 06/27/2022    To Whom It May Concern:    Alia Ramirez, was seen in my office today. She is scheduled for hand surgery July 7, 2022. Please allow her to work from home due to limited/no use of the left hand. Her work status/restrictions will be re-addressed at her post-operative appointment 7/22/2022. Please contact my office should you have any questions.      Sincerely,    Mt Esparza MD

## 2022-06-27 NOTE — PROCEDURES
Trigger Point Injection  Performed by: Mt Esparza MD  Authorized by: Mt Esparza MD     Cervical Paraspinal:  Left    Consent Done?:  Yes (Verbal)    Site marked: the procedure site was marked     Timeout: prior to procedure the correct patient, procedure, and site was verified    Prep: patient was prepped and draped in usual sterile fashion     Local anesthesia used?: Yes    Local anesthetic:  Lidocaine 1% without epinephrine  Medications: 40 mg triamcinolone acetonide 40 mg/mL

## 2022-06-27 NOTE — PROGRESS NOTES
Subjective:       Patient ID: Corinne Miller Wingerter is a 52 y.o. female.    Chief Complaint: Pain of the Neck (Neck and lumbar pain, did PT to lumbar less than year ago, did help, pain down left left from knee to foot, numbness left leg occas, no arm pain, numbness to b/l arms, limited standing, walking,bending, weakness to arms, ) and Pain of the Lumbar Spine      History of Present Illness    Prior to meeting with the patient I reviewed the medical chart in [facility]. This included reviewing the previous progress notes from our office, review of the patient's last appointment with their primary care provider, review of any visits to the emergency room, and review of any pain management appointments or procedures.     Patient is here for multiple orthopedic issues.  The 1st issue is bilateral carpal tunnel syndrome which wakes her up at night.  She has been dealing with this for over a year.  She has an EMG which demonstrates moderate bilateral carpal tunnel syndrome.    She is also here secondary to issues with her lumbar spine.  Has a history of lumbar degenerative disc disease with occasional back pain and limited lumbar flexion because of this pain.  Primarily she complains of left lower extremity radiculitis dysesthesia to the foot.  Denies any true bowel or bladder incontinence.  Underwent a course of physical therapy about a year ago.    Complains of some chronic neck pain mainly left cervical and upper trapezius.    Current Medications  Current Outpatient Medications   Medication Sig Dispense Refill    clotrimazole-betamethasone 1-0.05% (LOTRISONE) cream APPLY A SMALL AMOUNT AA BID FOR 7 TO 10 DAYS  0    hydrocortisone-pramoxine 2-1 % glpe Novacort 2 %-1 % topical gel   APPLY A THIN LAYER TO THE AFFECTED AREA(S) BY TOPICAL ROUTE 3-4 TIMESDAILY      hydrOXYzine HCl (ATARAX) 25 MG tablet   0     No current facility-administered medications for this visit.       Allergies  Review of patient's allergies  indicates:  No Known Allergies    Past Medical History  History reviewed. No pertinent past medical history.    Surgical History  Past Surgical History:   Procedure Laterality Date    PERCUTANEOUS TENOTOMY Right 9/29/2021    Procedure: TENOTOMY, PERCUTANEOUS HIP;  Surgeon: Oralia Finn DO;  Location: [facility];  Service: Orthopedics;  Laterality: Right;  will bring own crutches/walker       Family History:   History reviewed. No pertinent family history.    Social History:   Social History     Socioeconomic History    Marital status:    Tobacco Use    Smoking status: Never Smoker    Smokeless tobacco: Never Used   Substance and Sexual Activity    Alcohol use: Yes    Drug use: Never    Sexual activity: Yes       Hospitalization/Major Diagnostic Procedure:     Review of Systems     General/Constitutional:  Chills denies. Fatigue denies. Fever denies. Weight gain denies. Weight loss denies.    Respiratory:  Shortness of breath denies.    Cardiovascular:  Chest pain denies.    Gastrointestinal:  Constipation denies. Diarrhea denies. Nausea denies. Vomiting denies.     Hematology:  Easy bruising denies. Prolonged bleeding denies.     Genitourinary:  Frequent urination denies. Pain in lower back denies. Painful urination denies.     Musculoskeletal:  See HPI for details    Skin:  Rash denies.    Neurologic:  Dizziness denies. Gait abnormalities denies. Seizures denies. Tingling/Numbess denies.    Psychiatric:  Anxiety denies. Depressed mood denies.     Objective:   Vital Signs: There were no vitals filed for this visit.     Physical Exam      General Examination:     Constitutional: The patient is alert and oriented to lace person and time. Mood is pleasant.     Head/Face: Normal facial features normal eyebrows    Eyes: Normal extraocular motion bilaterally    Lungs: Respirations are equal and unlabored    Gait is coordinated.    Cardiovascular: There are no swelling or varicosities  present.    Lymphatic: Negative for adenopathy    Skin: Normal    Neurological: Level of consciousness normal. Oriented to place person and time and situation    Psychiatric: Oriented to time place person and situation    Lumbar exam demonstrates a nonantalgic gait.  Lumbar flexion approximately 70% of normal.  Extension is normal without any significant pain.  No significant tenderness palpation of the lumbar spine.  Bilateral lower extremities demonstrate full active range of motion, equal symmetric DTRs slightly hyperactive at 3, normal strength and negative straight leg raise maneuver.    Bilateral wrist exam is demonstrate positive Tinel's, positive Phalen's, and positive carpal compression.    Cervical exam demonstrates full active range of motion but she does have some pain with rotation both left and right.  Tenderness palpation with muscle spasm of the left upper trapezius muscle and the trigger point.  Bilateral upper extremities demonstrate full active range of motion, equal symmetric DTRs at 2+, normal strength and a positive Pennie's bilaterally.    XRAY Report/ Interpretation :  Bilateral upper extremity EMG study done about a year ago demonstrates moderate bilateral carpal tunnel syndrome    AP pelvis x-ray taken in the office today and reviewed with the patient demonstrates no significant abnormality.    Cervical AP and lateral x-rays taken in the office today reviewed the patient demonstrate no significant abnormality except for a lack of lordotic curvature    Lumbar AP and lateral x-rays taken in the office today reviewed the patient demonstrate multilevel moderate degenerative disc disease and moderate to significant facet sclerosis indicative of facet arthropathy.      Assessment:       1. Carpal tunnel syndrome on left    2. Carpal tunnel syndrome on right    3. Disc degeneration, lumbar    4. Facet arthritis of lumbar region    5. Spinal enthesopathy, cervical region    6. Lumbar  radiculopathy, chronic        Plan:       Corinne was seen today for pain and pain.    Diagnoses and all orders for this visit:    Carpal tunnel syndrome on left  -     X-Ray Lumbar Spine Ap And Lateral  -     X-Ray Pelvis Routine AP  -     X-Ray Cervical Spine AP And Lateral    Carpal tunnel syndrome on right    Disc degeneration, lumbar  -     MRI Lumbar Spine Without Contrast; Future    Facet arthritis of lumbar region  -     MRI Lumbar Spine Without Contrast; Future    Spinal enthesopathy, cervical region  -     Trigger Point Injection    Lumbar radiculopathy, chronic         Follow up for MRI Lumbar Results.  This is to attest that the physician's assistant Cristobal Verduzco served in the capacity as a scribe for this patient's encounter.  This is also to verify that I have reviewed the patient's history and helped formulate the treatment plan and discussed it with the physician's assistant.  I have actively participated  in the evaluation and treatment plan for this patient visit.  The treatment plan and medical decision-making is as outlined below:  At this time we recommend proceeding with carpal tunnel release surgery.  The patient would like to do the left side 1st followed by the right when she recovers.  The technical aspects of the surgery were discussed in detail with the patient today. The patient was able to verbalize an understanding. The procedure risk benefits alternatives and possible complications of the surgical procedure was discussed including expected outcomes. No guarantees were given regards to outcomes. Consent forms were will be signed at a later date. All questions regarding the surgery itself were answered. The patient wishes to proceed with surgery and will be scheduled. The patient may require preoperative medical clearance.  Regarding her lumbar spine, she has been through physical therapy with limited success.  Therefore we recommend a lumbar MRI without contrast.  She does have  some type of cochlear implant and we may have to downgrade this to a CT if we cannot find an MRI machine that is compatible with her implant.    Regarding the cervical spine she was given a trigger point injection of the left upper trapezius muscle.  We used 40 mg of Kenalog and 1 cc lidocaine.  Please see procedure report for details.  Hopefully this will resolve those symptoms.  If she remains with upper extremity symptomatology even after the recovery of bilateral carpal tunnel release surgeries then we may have to further investigate her cervical spine.  She is hyper reflexive with a positive Ventura's and hyperactive DTRs of the upper extremities.    Treatment options were discussed with regards to the nature of the medical condition. Conservative pain intervention and surgical options were discussed in detail. The probability of success of each separate treatment option was discussed. The patient expressed a clear understanding of the treatment options. With regards to surgery, the procedure risk, benefits, complications, and outcomes were discussed. No guarantees were given with regards to surgical outcome.   The risk of complications, morbidity, and mortality of patient management decisions have been made at the time of this visit. These are associated with the patient's problems, diagnostic procedures and treatment options. This includes the possible management options selected and those considered but not selected by the patient after shared medical decision making we discussed with the patient.   This note was created using Dragon voice recognition software that occasionally misinterpreted phrases or words.

## 2022-06-29 DIAGNOSIS — G56.02 CARPAL TUNNEL SYNDROME ON LEFT: Primary | ICD-10-CM

## 2022-07-05 ENCOUNTER — TELEPHONE (OUTPATIENT)
Dept: ORTHOPEDICS | Facility: CLINIC | Age: 52
End: 2022-07-05

## 2022-07-05 ENCOUNTER — HOSPITAL ENCOUNTER (OUTPATIENT)
Dept: PREADMISSION TESTING | Facility: HOSPITAL | Age: 52
Discharge: HOME | End: 2022-07-05
Payer: COMMERCIAL

## 2022-07-05 DIAGNOSIS — G56.02 CARPAL TUNNEL SYNDROME ON LEFT: ICD-10-CM

## 2022-07-05 NOTE — TELEPHONE ENCOUNTER
----- Message from Emelia Sarmiento sent at 7/5/2022  8:37 AM CDT -----  Regarding: Cancel Surgery  Pt called to postpone surgery for 07/07, [facility]. Pt phone # [phone]. -SHARRON

## 2023-09-01 ENCOUNTER — OFFICE VISIT (OUTPATIENT)
Dept: UROLOGY | Facility: CLINIC | Age: 53
End: 2023-09-01
Payer: COMMERCIAL

## 2023-09-01 VITALS — WEIGHT: 220 LBS | BODY MASS INDEX: 35.36 KG/M2 | HEIGHT: 66 IN | RESPIRATION RATE: 16 BRPM

## 2023-09-01 DIAGNOSIS — N39.0 RECURRENT UTI: Primary | ICD-10-CM

## 2023-09-01 DIAGNOSIS — Z78.0 POST-MENOPAUSAL: ICD-10-CM

## 2023-09-01 LAB
BILIRUBIN, UA POC OHS: NEGATIVE
BLOOD, UA POC OHS: NEGATIVE
CLARITY, UA POC OHS: CLEAR
COLOR, UA POC OHS: YELLOW
GLUCOSE, UA POC OHS: NEGATIVE
KETONES, UA POC OHS: NEGATIVE
LEUKOCYTES, UA POC OHS: NEGATIVE
NITRITE, UA POC OHS: NEGATIVE
PH, UA POC OHS: 5.5
PROTEIN, UA POC OHS: NEGATIVE
SPECIFIC GRAVITY, UA POC OHS: <=1.005
UROBILINOGEN, UA POC OHS: 0.2

## 2023-09-01 PROCEDURE — 1160F PR REVIEW ALL MEDS BY PRESCRIBER/CLIN PHARMACIST DOCUMENTED: ICD-10-PCS | Mod: CPTII,S$GLB,, | Performed by: NURSE PRACTITIONER

## 2023-09-01 PROCEDURE — 99999 PR PBB SHADOW E&M-EST. PATIENT-LVL IV: ICD-10-PCS | Mod: PBBFAC,,, | Performed by: NURSE PRACTITIONER

## 2023-09-01 PROCEDURE — 81003 URINALYSIS AUTO W/O SCOPE: CPT | Mod: QW,S$GLB,, | Performed by: NURSE PRACTITIONER

## 2023-09-01 PROCEDURE — 99999 PR PBB SHADOW E&M-EST. PATIENT-LVL IV: CPT | Mod: PBBFAC,,, | Performed by: NURSE PRACTITIONER

## 2023-09-01 PROCEDURE — 3008F BODY MASS INDEX DOCD: CPT | Mod: CPTII,S$GLB,, | Performed by: NURSE PRACTITIONER

## 2023-09-01 PROCEDURE — 81003 POCT URINALYSIS(INSTRUMENT): ICD-10-PCS | Mod: QW,S$GLB,, | Performed by: NURSE PRACTITIONER

## 2023-09-01 PROCEDURE — 99204 PR OFFICE/OUTPT VISIT, NEW, LEVL IV, 45-59 MIN: ICD-10-PCS | Mod: S$GLB,,, | Performed by: NURSE PRACTITIONER

## 2023-09-01 PROCEDURE — 3008F PR BODY MASS INDEX (BMI) DOCUMENTED: ICD-10-PCS | Mod: CPTII,S$GLB,, | Performed by: NURSE PRACTITIONER

## 2023-09-01 PROCEDURE — 99204 OFFICE O/P NEW MOD 45 MIN: CPT | Mod: S$GLB,,, | Performed by: NURSE PRACTITIONER

## 2023-09-01 PROCEDURE — 1159F PR MEDICATION LIST DOCUMENTED IN MEDICAL RECORD: ICD-10-PCS | Mod: CPTII,S$GLB,, | Performed by: NURSE PRACTITIONER

## 2023-09-01 PROCEDURE — 1159F MED LIST DOCD IN RCRD: CPT | Mod: CPTII,S$GLB,, | Performed by: NURSE PRACTITIONER

## 2023-09-01 PROCEDURE — 1160F RVW MEDS BY RX/DR IN RCRD: CPT | Mod: CPTII,S$GLB,, | Performed by: NURSE PRACTITIONER

## 2023-09-01 NOTE — PROGRESS NOTES
"CHIEF COMPLAINT:    Mrs Ramirez is a 53 y.o. female presenting for recurrent UTI.  PRESENTING ILLNESS:    Corinne Miller Wingerter is a 53 y.o. female who presents for recurrent UTI. This is her initial clinic visit.    9/1/23  Pt presents today for recurrent UTI  Pt was diagnosed by PCP with UTI based on UA (on AZO). She received 2 courses of bactrim 5/24 and 8/3. She had culture completed 8/22/23 resulted E faecalis and prescribed macrobid. Bactrim did not cover organism. Pt reports she has been having UTI's since her visit with Dr. Nair but rarely has culture completed.   UTI symptoms include occasional dysuria and low back pain  Denies gross hematuria, flank pain, fever, chills, nausea or vomiting associated with UTI.  No UTI symptoms today in clinic  UA negative    No issues voiding as baseline. No UUI or FABRIZIO. Does not wear pads.    No hx of hysterectomy or bladder surgeries  Patient has hormone pellets placed by PCP. She is unsure if she would like to continue on them. She would like referral to GYN for post menopausal symptoms.    Pt unsure if UTI's correlate with intercourse    Pt has seen Dr Nair with Urogyn in the past (8/19/19).     Drinks ~30-48 oz water per day  + constipation, treated with diet    History of kidney stones: denies  Personal or family hx of  malignancy: denies  Smoking history: never smoked      Urine cultures:   No results found for: "LABURIN"    REVIEW OF SYSTEMS:    Review of Systems    Constitutional: Negative for fever and chills.   Eyes: Negative for visual disturbance.   Respiratory: Negative for shortness of breath.   Cardiovascular: Negative for chest pain.   Gastrointestinal: Negative for nausea, vomiting.  Genitourinary:  See above  Neurological: Negative for dizziness.   Hematological: Does not bruise/bleed easily.   Psychiatric/Behavioral: Negative for confusion.     PATIENT HISTORY:    History reviewed. No pertinent past medical history.    Past Surgical History: "   Procedure Laterality Date    PERCUTANEOUS TENOTOMY Right 9/29/2021    Procedure: TENOTOMY, PERCUTANEOUS HIP;  Surgeon: Oralia Finn DO;  Location: [facility] OR;  Service: Orthopedics;  Laterality: Right;  will bring own crutches/walker       History reviewed. No pertinent family history.    Social History     Socioeconomic History    Marital status:    Tobacco Use    Smoking status: Never    Smokeless tobacco: Never   Substance and Sexual Activity    Alcohol use: Yes    Drug use: Never    Sexual activity: Yes       Allergies:  Patient has no known allergies.    Medications:    Current Outpatient Medications:     clotrimazole-betamethasone 1-0.05% (LOTRISONE) cream, APPLY A SMALL AMOUNT AA BID FOR 7 TO 10 DAYS, Disp: , Rfl: 0    hydrocortisone-pramoxine 2-1 % glpe, Novacort 2 %-1 % topical gel  APPLY A THIN LAYER TO THE AFFECTED AREA(S) BY TOPICAL ROUTE 3-4 TIMESDAILY, Disp: , Rfl:     hydrOXYzine HCl (ATARAX) 25 MG tablet, , Disp: , Rfl: 0    PHYSICAL EXAMINATION:    Constitutional: She is oriented to person, place, and time. She appears well-developed and well-nourished.  She is in no apparent distress.    Neck: Normal ROM.     Cardiovascular: Normal rate.      Pulmonary/Chest: Effort normal. No respiratory distress.     Abdominal:  She exhibits no distension.  There is no CVA tenderness.     Neurological: She is alert and oriented to person, place, and time.     Skin: Skin is warm and dry.     Psych: Cooperative with normal affect.    Physical Exam    LABS:      Lab Results   Component Value Date    CREATININE 0.9 09/17/2021       IMPRESSION:    Encounter Diagnoses   Name Primary?    Recurrent UTI Yes    Post-menopausal        PLAN:  -JEMMA ordered and schedule  -UA negative, no culture needed.  -Discussed importance of culture prior to antibiotics. UTI should not be treated based on UA results, especially when taking AZO, which can alter results  -UTI prevention discussed in detail and given on  AVS  -If pt feels UTI's correlate with intercourse, will start post coital prophylaxis  -Referral to GYN for post menopausal symptoms  -RTC 3 months    I encouraged her or any of her family members to call or email me with questions and/or concerns.      45 minutes of total time spent on the encounter, which includes face to face time and non-face to face time preparing to see the patient (eg, review of tests), Obtaining and/or reviewing separately obtained history, Documenting clinical information in the electronic or other health record, Independently interpreting results (not separately reported) and communicating results to the patient/family/caregiver, or Care coordination (not separately reported).

## 2023-09-01 NOTE — PATIENT INSTRUCTIONS
Preventative DAILY supplements:  Recommend Buying a Cranberry Supplement that has 36mg PAC (only a few have this much) of cranberry - it is a very specific dose but many companies sell it.   Preferred: Utiva Cranberry Tablets (Utiva Cranberry PACs Supplement Pills $39.99 for 30 pills)- their particular tablet is the equivalent of 9 cranberry tablets that you buy over the counter. (phone 1-425.856.8490 or online https://www.[employer]/products/utiva-uti-control)  Uriexo Cranberry Tablets   Nadine  [employer] $30/month: https://[email or web address]/cranberex-urinary-health-support  If unable to afford- can use over the counter cranberry caplets but will need to take 1500mg daily (about 3 capsules, 3x a day)   Ok to Buy Over the Counter at [employer] (ask pharmacist for help) or buy from [employer] (see above)  Probiotic with lactobacillus - take once a day. This helps populate the vagina with good bacteria instead of bad bacteria- you can buy this over the counter or buy from the company [employer]. Make sure to look for LACTOBACILLUS on the bottle.   D-mannose supplement (2000mg a day)  $20/30d supply  This helps keep the bad bacteria from sticking to your bladder wall. You can buy this over the counter or buy from [employer] by visiting [employer].     If you find the UTI's correlate with intercourse, can consider starting post coital antibiotics      UTI prevention recommendations  Drink more water  to dilute the bacteria that are replicating. This will also help you urinate more often if you tend to hold your bladder.   Timed voiding (which means- Urinate every 2 hours during the day) to rid the bladder of bacteria before they multiply and start to stick to your bladder walls and cause more symptoms and problems  Avoid pads if possible or wear thinner pads and change them more often  Avoid/treat constipation    As long as patient has no history of breast cancer and  post-menopausal:  Vaginal Hormone cream (Estrace, Premarin or Compounded) you place vaginally 2x a week using your finger(if no history of heart attack, blood clots, cervical, breast, uterine or ovarian cancer). This will help the good bacteria replenish in the vagina. Similar to a probiotic. Lack of hormones in the vagina in post-menopausal women is a common cause of UTIs in women.   Use nightly for 2 weeks and then every other night after indefinitely around the urethra and into the vagina using finger for application. See d/c instructions for details if too expensive. The patient denies any history of breast cancer, uterine cancer, cervical cancer, abnormal pap smears.   Hormone cream- why you need it, how to use it  We all have bacteria that cover our bodies, however we want good bacteria, not bad bacteria. When you lack hormones,  your vagina starts to let the bad bacteria take over because there is an imbalance. The hormone cream allows for good bacteria to take over again and this can help decrease the risks of UTIs. It can also help with vaginal dryness.   Please let your provider know if you have a history of breast cancer, uterine cancer, cervical cancer or a history of blood clots or heart attack.  Can also use goodrx  Do not use the applicator, just your finger. This will make it last longer. Apply small smear to 2nd knuckle. Apply around urethra and into vagina to 2nd knuckle. Prescription should last around 6 months. Apply inside vagina with finger daily x 2 weeks and then 2 to 3x per week after this indefinitely.  You will only see improvement if you use on a regular basis in about a month. (less dryness, maybe some less overactive bladder, less UTIs possibly if having UTIs).   It is not a medicine to use as needed. Typically a lifelong medication.       Other helpful information:    If you have a UTI try to self treat first for 1-2 days with conservative treatment:  Increase fluid intake - drink 4 to  "5 bottles of water a day and empty bladder often  AZO for burning or urinary frequency (over the counter)  Utiva cranberry tablets when having uti symptoms: Take 2x a day for 2 days then daily for a week (buy from [employer]). Visit https://www.[employer] or call 1-364.664.1758 to order. They costs about $30/month and offer a subscribe and save option. They also have a probiotic and D mannose supplementation, if the patient is able to afford, I suggest taking. Utiva cranberry tablets only available from [employer] are equivalent to the suggested dose of 9 tablets if you buy over the counter.   D mannose 2000mx a day for 2 days then daily for a week (can buy from [employer] or over the counter)    If your symptoms persists despite increased water intake and azo then:  see pcp, gynecologist, or urgent care and make sure to give a clean catch urine or catheterized urine. This means wipe, urinate in the toilet, then provide a MID STREAM sample (your hand should get urine on it if you are doing it right). This avoids urine picking up the normal bacteria that line the vagina on the way out to the cup.   ask for a URINE CULTURE. You need to make sure you know what antibiotics will kill your particular bacteria  if you are told you have "multiple organisms" or "normal vaginal yaritza" it means the urine sample picked up the normal bacteria in the vagina and contaminated your urine specimen. If you are still having symptoms of a UTI then you will need to provide ANOTHER clean catch sample or CATHETERIZED urine to bypass the vagina and get urine directly from the bladder:     If you are given antibiotics from primary care, ER, urgent care or gynecologist:  only take 3 to 5 days of the antibiotics (unless you have diabetes or a urologist tells you take take more), even if they give you a 10d supply and keep or discard the rest  only take the full 10 days if you are having fever, chills or pain in your kidneys     Things " "to remember:   Try to avoid antibiotics or at least try to avoid taking them for more than 3 to 5 days for simple uti.    Bacteria are smart and they will become resistant to antibiotics. We have only a limited amount of antibiotics that work.   ALWAYS request a urine culture so you can know which antibiotics work.   If you ever see bloody red urine call us ASAP, even with uti's and even if you are on a blood thinner, this can sometimes be a sign of bladder cancer or kidney stones.     If you do have uti symptoms but do not have a culture proven uti (with E.coli, for example)  You may need a catheterized urine if it says "multiple organisms" which means normal vaginal yaritza  You may have a kidney stone, interstitial cystitis, overactive bladder, bladder cancer, so please call to make a follow-up     Once we rule out any anatomic obstruction and have given UTI recs, we will see for follow-up and then will have you return to your PCP/primary care physician  for symptomatic UTI treatment wit the information and recommendations we have given them.    "

## 2023-09-05 ENCOUNTER — TELEPHONE (OUTPATIENT)
Dept: FAMILY MEDICINE | Facility: CLINIC | Age: 53
End: 2023-09-05
Payer: COMMERCIAL

## 2023-09-06 ENCOUNTER — HOSPITAL ENCOUNTER (OUTPATIENT)
Dept: RADIOLOGY | Facility: HOSPITAL | Age: 53
Discharge: HOME | End: 2023-09-06
Attending: NURSE PRACTITIONER
Payer: COMMERCIAL

## 2023-09-06 DIAGNOSIS — N39.0 RECURRENT UTI: ICD-10-CM

## 2023-09-06 PROCEDURE — 76770 US EXAM ABDO BACK WALL COMP: CPT | Mod: TC

## 2023-09-27 ENCOUNTER — LAB VISIT (OUTPATIENT)
Dept: LAB | Facility: HOSPITAL | Age: 53
End: 2023-09-27
Attending: OBSTETRICS & GYNECOLOGY
Payer: COMMERCIAL

## 2023-09-27 ENCOUNTER — PATIENT MESSAGE (OUTPATIENT)
Dept: UROLOGY | Facility: CLINIC | Age: 53
End: 2023-09-27
Payer: COMMERCIAL

## 2023-09-27 ENCOUNTER — PATIENT MESSAGE (OUTPATIENT)
Dept: OBSTETRICS AND GYNECOLOGY | Facility: CLINIC | Age: 53
End: 2023-09-27

## 2023-09-27 ENCOUNTER — OFFICE VISIT (OUTPATIENT)
Dept: OBSTETRICS AND GYNECOLOGY | Facility: CLINIC | Age: 53
End: 2023-09-27
Payer: COMMERCIAL

## 2023-09-27 VITALS
HEART RATE: 84 BPM | DIASTOLIC BLOOD PRESSURE: 87 MMHG | HEIGHT: 66 IN | SYSTOLIC BLOOD PRESSURE: 140 MMHG | WEIGHT: 222.69 LBS | BODY MASS INDEX: 35.79 KG/M2

## 2023-09-27 DIAGNOSIS — N95.9 MENOPAUSAL DISORDER: ICD-10-CM

## 2023-09-27 DIAGNOSIS — Z12.31 ENCOUNTER FOR SCREENING MAMMOGRAM FOR MALIGNANT NEOPLASM OF BREAST: ICD-10-CM

## 2023-09-27 DIAGNOSIS — Z12.4 SCREENING FOR MALIGNANT NEOPLASM OF CERVIX: ICD-10-CM

## 2023-09-27 DIAGNOSIS — N39.0 RECURRENT UTI: ICD-10-CM

## 2023-09-27 DIAGNOSIS — N95.2 ATROPHIC VAGINITIS: ICD-10-CM

## 2023-09-27 DIAGNOSIS — Z01.419 WELL WOMAN EXAM WITH ROUTINE GYNECOLOGICAL EXAM: Primary | ICD-10-CM

## 2023-09-27 LAB
ESTRADIOL SERPL-MCNC: 40 PG/ML
FSH SERPL-ACNC: 41.21 MIU/ML
LH SERPL-ACNC: 14.8 MIU/ML
TSH SERPL DL<=0.005 MIU/L-ACNC: 1.37 UIU/ML (ref 0.4–4)

## 2023-09-27 PROCEDURE — 36415 COLL VENOUS BLD VENIPUNCTURE: CPT | Mod: PO | Performed by: OBSTETRICS & GYNECOLOGY

## 2023-09-27 PROCEDURE — 3077F SYST BP >= 140 MM HG: CPT | Mod: CPTII,S$GLB,, | Performed by: OBSTETRICS & GYNECOLOGY

## 2023-09-27 PROCEDURE — 87086 URINE CULTURE/COLONY COUNT: CPT | Performed by: OBSTETRICS & GYNECOLOGY

## 2023-09-27 PROCEDURE — 83001 ASSAY OF GONADOTROPIN (FSH): CPT | Performed by: OBSTETRICS & GYNECOLOGY

## 2023-09-27 PROCEDURE — 87624 HPV HI-RISK TYP POOLED RSLT: CPT | Performed by: OBSTETRICS & GYNECOLOGY

## 2023-09-27 PROCEDURE — 3077F PR MOST RECENT SYSTOLIC BLOOD PRESSURE >= 140 MM HG: ICD-10-PCS | Mod: CPTII,S$GLB,, | Performed by: OBSTETRICS & GYNECOLOGY

## 2023-09-27 PROCEDURE — 99386 PREV VISIT NEW AGE 40-64: CPT | Mod: S$GLB,,, | Performed by: OBSTETRICS & GYNECOLOGY

## 2023-09-27 PROCEDURE — 88175 CYTOPATH C/V AUTO FLUID REDO: CPT | Performed by: OBSTETRICS & GYNECOLOGY

## 2023-09-27 PROCEDURE — 84443 ASSAY THYROID STIM HORMONE: CPT | Performed by: OBSTETRICS & GYNECOLOGY

## 2023-09-27 PROCEDURE — 1159F PR MEDICATION LIST DOCUMENTED IN MEDICAL RECORD: ICD-10-PCS | Mod: CPTII,S$GLB,, | Performed by: OBSTETRICS & GYNECOLOGY

## 2023-09-27 PROCEDURE — 99386 PR PREVENTIVE VISIT,NEW,40-64: ICD-10-PCS | Mod: S$GLB,,, | Performed by: OBSTETRICS & GYNECOLOGY

## 2023-09-27 PROCEDURE — 99999 PR PBB SHADOW E&M-EST. PATIENT-LVL IV: ICD-10-PCS | Mod: PBBFAC,,, | Performed by: OBSTETRICS & GYNECOLOGY

## 2023-09-27 PROCEDURE — 3079F PR MOST RECENT DIASTOLIC BLOOD PRESSURE 80-89 MM HG: ICD-10-PCS | Mod: CPTII,S$GLB,, | Performed by: OBSTETRICS & GYNECOLOGY

## 2023-09-27 PROCEDURE — 3008F PR BODY MASS INDEX (BMI) DOCUMENTED: ICD-10-PCS | Mod: CPTII,S$GLB,, | Performed by: OBSTETRICS & GYNECOLOGY

## 2023-09-27 PROCEDURE — 82040 ASSAY OF SERUM ALBUMIN: CPT | Performed by: OBSTETRICS & GYNECOLOGY

## 2023-09-27 PROCEDURE — 3079F DIAST BP 80-89 MM HG: CPT | Mod: CPTII,S$GLB,, | Performed by: OBSTETRICS & GYNECOLOGY

## 2023-09-27 PROCEDURE — 99999 PR PBB SHADOW E&M-EST. PATIENT-LVL IV: CPT | Mod: PBBFAC,,, | Performed by: OBSTETRICS & GYNECOLOGY

## 2023-09-27 PROCEDURE — 3008F BODY MASS INDEX DOCD: CPT | Mod: CPTII,S$GLB,, | Performed by: OBSTETRICS & GYNECOLOGY

## 2023-09-27 PROCEDURE — 83002 ASSAY OF GONADOTROPIN (LH): CPT | Performed by: OBSTETRICS & GYNECOLOGY

## 2023-09-27 PROCEDURE — 1159F MED LIST DOCD IN RCRD: CPT | Mod: CPTII,S$GLB,, | Performed by: OBSTETRICS & GYNECOLOGY

## 2023-09-27 PROCEDURE — 82670 ASSAY OF TOTAL ESTRADIOL: CPT | Performed by: OBSTETRICS & GYNECOLOGY

## 2023-09-27 RX ORDER — CYCLOBENZAPRINE HCL 5 MG
5 TABLET ORAL NIGHTLY
COMMUNITY
Start: 2023-08-31

## 2023-09-27 NOTE — PROGRESS NOTES
Chief Complaint   Patient presents with    Annual Exam     UTI ,        History and Physical:  Patient's last menstrual period was 2020.    HRT:  Estrogen pellet and oral micronized progesterone    Date: 2023    The patient is hearing impaired but reads lips without difficulty and has cochlear implant.    Corinne Miller Wingerter is a 53 y.o.  who presents today for her routine annual GYN exam.   In addition to annual exam issues she would like to discuss her current menopausal situation.  She is due to initiate her 2nd pellet therapy over the next few days.  She has a pellet which contains estrogen and takes micronized progesterone by mouth.  She is concerning the potential side effects and would like to discuss alternative options.  From a menopausal standpoint her issues include weight issues, vaginal dryness as well as mood swings and sleeping issues.    In addition she reports a history of frequent urinary tract infections over the last 3-4 years which she feels is secondary to menopause and vaginal dryness.    No recent postmenopausal bleeding or pelvic pain.    She reports a colonoscopy one year ago as well as a mammogram one year ago.    Allergies: Review of patient's allergies indicates:  No Known Allergies    Past Medical History:   Diagnosis Date    Cochlear implant in place     Hearing impaired        Past Surgical History:   Procedure Laterality Date    IMPLANTATION OF COCHLEAR PROSTHESIS      PERCUTANEOUS TENOTOMY Right 2021    Procedure: TENOTOMY, PERCUTANEOUS HIP;  Surgeon: Oralia Finn DO;  [facility];  Service: Orthopedics;  Laterality: Right;  will bring own crutches/walker       MEDS:   Current Outpatient Medications:     clotrimazole-betamethasone 1-0.05% (LOTRISONE) cream, APPLY A SMALL AMOUNT AA BID FOR 7 TO 10 DAYS, Disp: , Rfl: 0    cyclobenzaprine (FLEXERIL) 5 MG tablet, Take 5 mg by mouth every evening., Disp: , Rfl:     hydrocortisone-pramoxine 2-1  "% glpe, Novacort 2 %-1 % topical gel  APPLY A THIN LAYER TO THE AFFECTED AREA(S) BY TOPICAL ROUTE 3-4 TIMESDAILY, Disp: , Rfl:     hydrOXYzine HCl (ATARAX) 25 MG tablet, , Disp: , Rfl: 0     OB History          3    Para   3    Term   3            AB        Living   3         SAB        IAB        Ectopic        Multiple        Live Births   3           Obstetric Comments   Vaginal delivery x 3               Social History     Tobacco Use    Smoking status: Never    Smokeless tobacco: Never   Substance Use Topics    Alcohol use: Yes    Drug use: Never       History reviewed. No pertinent family history.    Past medical and surgical history reviewed.   I have reviewed the patient's medical history in detail and updated the computerized patient record.    Review of Systems (at today's evaluation)  Review of Systems   Constitutional:  Negative for fever and unexpected weight change.   HENT:          Hearing impaired   Respiratory:  Negative for cough and shortness of breath.    Cardiovascular:  Negative for chest pain.   Gastrointestinal:  Negative for constipation, diarrhea and nausea.   Genitourinary:  Negative for dysuria and frequency.          GYN AS PER HPI   Musculoskeletal: Negative.    Skin: Negative.    Neurological: Negative.         Physical Exam:   BP (!) 140/87   Pulse 84   Ht 5' 6" (1.676 m)   Wt 101 kg (222 lb 10.6 oz)   LMP 2020   BMI 35.94 kg/m²       Physical Exam:   Constitutional: She appears well-developed and well-nourished.    HENT:   Head: Normocephalic.     Neck: No thyroid mass present.    Cardiovascular:  Normal rate.             Pulmonary/Chest: Effort normal. Right breast exhibits no mass, no nipple discharge and no skin change. Left breast exhibits no mass, no nipple discharge and no skin change.        Abdominal: Soft. There is no abdominal tenderness.     Genitourinary:    Inguinal canal, uterus, right adnexa and left adnexa normal.      Pelvic exam was " performed with patient supine.   The external female genitalia was normal.   No external genitalia lesions identified,Cervix is normal. Right adnexum displays no mass and no tenderness. Left adnexum displays no mass and no tenderness. There is erythema in the vagina. No tenderness or bleeding in the vagina. Vaginal atrophy noted. Uterus is not tender. Normal urethral meatus.Urethra findings: no tendernessBladder findings: no bladder tenderness          Musculoskeletal:      Right lower leg: No edema.      Left lower leg: No edema.      Lymphadenopathy:     She has no cervical adenopathy. No inguinal adenopathy noted on the right or left side.    Neurological: She is alert.   No gross defects noted    Skin: Skin is warm and dry.    Psychiatric: Mood normal.        Assessment:        1. Well woman exam with routine gynecological exam    2. Menopausal disorder    3. Recurrent UTI    4. Atrophic vaginitis    5. Screening for malignant neoplasm of cervix    6. Encounter for screening mammogram for malignant neoplasm of breast         Plan:      Well woman exam with routine gynecological exam    Menopausal disorder  -     Ambulatory referral/consult to Obstetrics / Gynecology  -     Luteinizing Hormone; Future; Expected date: 09/27/2023  -     Follicle Stimulating Hormone; Future; Expected date: 09/27/2023  -     Testosterone Panel; Future; Expected date: 09/27/2023  -     Estradiol; Future; Expected date: 09/27/2023  -     Mammo Digital Screening Bilat; Future; Expected date: 09/27/2023  -     TSH; Future; Expected date: 09/27/2023    Recurrent UTI  -     Urine culture    Atrophic vaginitis    Screening for malignant neoplasm of cervix  -     HPV High Risk Genotypes, PCR  -     Liquid-Based Pap Smear, Screening    Encounter for screening mammogram for malignant neoplasm of breast  -     Mammo Digital Screening Bilat; Future; Expected date: 09/27/2023       Follow up in about 2 weeks (around 10/11/2023) for Follow-up on  today's evaluation.     The above was reviewed and discussed with the patient.    Annual exam and screening issues based on the patient's age, medical history and family history were reviewed / discussed.  Routine health maintenance issues were reviewed and discussed.      We reviewed the findings of atrophic vaginitis and other menopausal findings as well as the patient's current hormone replacement therapy.    At this time will proceed as follows:  Urine will be sent for culture   We will obtain a baseline hormonal evaluation as the patient is currently at the end of her pellet therapy.  We discussed continued pellet therapy versus topical versus oral therapy and the patient will consider the options while we wait the results of her laboratory evaluation.    The patient's questions were answered, and she is in agreement with the current plan.     Hemal Marcano MD  Department OBGYN [facility]

## 2023-09-28 ENCOUNTER — TELEPHONE (OUTPATIENT)
Dept: OBSTETRICS AND GYNECOLOGY | Facility: CLINIC | Age: 53
End: 2023-09-28
Payer: COMMERCIAL

## 2023-09-28 DIAGNOSIS — Z12.39 ENCOUNTER FOR SCREENING FOR MALIGNANT NEOPLASM OF BREAST, UNSPECIFIED SCREENING MODALITY: Primary | ICD-10-CM

## 2023-09-28 NOTE — TELEPHONE ENCOUNTER
Placed correct mammogram order and faxed to [facility].  Attempted to call Brisa @ [facility] with no answer.

## 2023-09-28 NOTE — TELEPHONE ENCOUNTER
----- Message from Migdalia Contreras sent at 9/28/2023  8:52 AM CDT -----  Type:  Mammogram    Name of Caller:Maki with [facility]    Best Call Back Number:[phone] ext [phone]    Additional Information: Maki said that the orders sent over for the pt are only for 2 images and not 3d and she usually gets the 3d imaging. Wants to know if that can be added to the order.     Please call Back to advise. Thanks!    Fax order to [phone]  Attn: Maki

## 2023-09-29 LAB — BACTERIA UR CULT: NO GROWTH

## 2023-10-03 LAB
HPV HR 12 DNA SPEC QL NAA+PROBE: NEGATIVE
HPV16 AG SPEC QL: NEGATIVE
HPV18 DNA SPEC QL NAA+PROBE: NEGATIVE

## 2023-10-04 LAB
FINAL PATHOLOGIC DIAGNOSIS: NORMAL
Lab: NORMAL

## 2023-10-13 LAB
ALBUMIN SERPL-MCNC: 4.4 G/DL (ref 3.6–5.1)
SHBG SERPL-SCNC: 49 NMOL/L (ref 17–124)
TESTOST FREE SERPL-MCNC: 12.3 PG/ML (ref 0.2–5)
TESTOST SERPL-MCNC: 140 NG/DL (ref 2–45)
TESTOSTERONE.FREE+WB SERPL-MCNC: 24.7 NG/DL (ref 0.5–8.5)

## 2023-10-18 ENCOUNTER — OFFICE VISIT (OUTPATIENT)
Dept: OBSTETRICS AND GYNECOLOGY | Facility: CLINIC | Age: 53
End: 2023-10-18
Payer: COMMERCIAL

## 2023-10-18 VITALS
SYSTOLIC BLOOD PRESSURE: 123 MMHG | DIASTOLIC BLOOD PRESSURE: 78 MMHG | BODY MASS INDEX: 35.79 KG/M2 | HEIGHT: 66 IN | RESPIRATION RATE: 18 BRPM | WEIGHT: 222.69 LBS

## 2023-10-18 DIAGNOSIS — N95.9 MENOPAUSAL DISORDER: Primary | ICD-10-CM

## 2023-10-18 PROCEDURE — 3078F DIAST BP <80 MM HG: CPT | Mod: CPTII,S$GLB,, | Performed by: OBSTETRICS & GYNECOLOGY

## 2023-10-18 PROCEDURE — 99213 PR OFFICE/OUTPT VISIT, EST, LEVL III, 20-29 MIN: ICD-10-PCS | Mod: S$GLB,,, | Performed by: OBSTETRICS & GYNECOLOGY

## 2023-10-18 PROCEDURE — 3008F BODY MASS INDEX DOCD: CPT | Mod: CPTII,S$GLB,, | Performed by: OBSTETRICS & GYNECOLOGY

## 2023-10-18 PROCEDURE — 99999 PR PBB SHADOW E&M-EST. PATIENT-LVL III: CPT | Mod: PBBFAC,,, | Performed by: OBSTETRICS & GYNECOLOGY

## 2023-10-18 PROCEDURE — 99999 PR PBB SHADOW E&M-EST. PATIENT-LVL III: ICD-10-PCS | Mod: PBBFAC,,, | Performed by: OBSTETRICS & GYNECOLOGY

## 2023-10-18 PROCEDURE — 3008F PR BODY MASS INDEX (BMI) DOCUMENTED: ICD-10-PCS | Mod: CPTII,S$GLB,, | Performed by: OBSTETRICS & GYNECOLOGY

## 2023-10-18 PROCEDURE — 3074F PR MOST RECENT SYSTOLIC BLOOD PRESSURE < 130 MM HG: ICD-10-PCS | Mod: CPTII,S$GLB,, | Performed by: OBSTETRICS & GYNECOLOGY

## 2023-10-18 PROCEDURE — 3078F PR MOST RECENT DIASTOLIC BLOOD PRESSURE < 80 MM HG: ICD-10-PCS | Mod: CPTII,S$GLB,, | Performed by: OBSTETRICS & GYNECOLOGY

## 2023-10-18 PROCEDURE — 99213 OFFICE O/P EST LOW 20 MIN: CPT | Mod: S$GLB,,, | Performed by: OBSTETRICS & GYNECOLOGY

## 2023-10-18 PROCEDURE — 1159F MED LIST DOCD IN RCRD: CPT | Mod: CPTII,S$GLB,, | Performed by: OBSTETRICS & GYNECOLOGY

## 2023-10-18 PROCEDURE — 3074F SYST BP LT 130 MM HG: CPT | Mod: CPTII,S$GLB,, | Performed by: OBSTETRICS & GYNECOLOGY

## 2023-10-18 PROCEDURE — 1159F PR MEDICATION LIST DOCUMENTED IN MEDICAL RECORD: ICD-10-PCS | Mod: CPTII,S$GLB,, | Performed by: OBSTETRICS & GYNECOLOGY

## 2023-10-18 NOTE — PROGRESS NOTES
Chief Complaint   Patient presents with    Follow-up     Lab results       History and Physical:  Patient's last menstrual period was 2020.    HRT:  Estrogen pellet and oral micronized progesterone    Date: 2023    The patient is hearing impaired but reads lips without difficulty and has cochlear implant.    Corinne Miller Wingerter is a 53 y.o.  who presents today for her routine annual GYN exam.   In addition to annual exam issues she would like to discuss her current menopausal situation.  She is due to initiate her 2nd pellet therapy over the next few days.  She has a pellet which contains estrogen and takes micronized progesterone by mouth.  She is concerning the potential side effects and would like to discuss alternative options.  From a menopausal standpoint her issues include weight issues, vaginal dryness as well as mood swings and sleeping issues.    In addition she reports a history of frequent urinary tract infections over the last 3-4 years which she feels is secondary to menopause and vaginal dryness.    No recent postmenopausal bleeding or pelvic pain.    She reports a colonoscopy one year ago as well as a mammogram one year ago.    Date: 10/18/2023    The patient presents today for follow-up.  She was last seen as above.  In light of the above lab work was obtained and the patient presents to discuss the results.    Since her last evaluation on 10/02/2023 she did have a new hormone replacement therapy pellet placed but reports that less testosterone was placed in the new pellet.      Allergies: Review of patient's allergies indicates:  No Known Allergies    Past Medical History:   Diagnosis Date    Cochlear implant in place     Hearing impaired        Past Surgical History:   Procedure Laterality Date    IMPLANTATION OF COCHLEAR PROSTHESIS      PERCUTANEOUS TENOTOMY Right 2021    Procedure: TENOTOMY, PERCUTANEOUS HIP;  Surgeon: Oralia Finn DO;  Location: [facility] OR;   "Service: Orthopedics;  Laterality: Right;  will bring own crutches/walker       MEDS:   Current Outpatient Medications:     clotrimazole-betamethasone 1-0.05% (LOTRISONE) cream, APPLY A SMALL AMOUNT AA BID FOR 7 TO 10 DAYS, Disp: , Rfl: 0    cyclobenzaprine (FLEXERIL) 5 MG tablet, Take 5 mg by mouth every evening., Disp: , Rfl:      OB History          3    Para   3    Term   3            AB        Living   3         SAB        IAB        Ectopic        Multiple        Live Births   3           Obstetric Comments   Vaginal delivery x 3               Social History     Tobacco Use    Smoking status: Never    Smokeless tobacco: Never   Substance Use Topics    Alcohol use: Yes    Drug use: Never       History reviewed. No pertinent family history.    Past medical and surgical history reviewed.   I have reviewed the patient's medical history in detail and updated the computerized patient record.    Review of Systems (at today's evaluation)  Review of Systems   Constitutional:  Negative for fever and unexpected weight change.   HENT:          Hearing impaired   Respiratory:  Negative for cough and shortness of breath.    Cardiovascular:  Negative for chest pain.   Gastrointestinal:  Negative for constipation, diarrhea and nausea.   Genitourinary:  Negative for dysuria and frequency.          GYN AS PER HPI   Musculoskeletal: Negative.    Skin: Negative.    Neurological: Negative.         Physical Exam:   /78 (BP Location: Left arm, Patient Position: Sitting, BP Method: Medium (Manual))   Resp 18   Ht 5' 6" (1.676 m)   Wt 101 kg (222 lb 10.6 oz)   LMP 2020   BMI 35.94 kg/m²       Physical Exam:   Constitutional: She appears well-developed and well-nourished.    HENT:   Head: Normocephalic.     Neck: No thyroid mass present.    Cardiovascular:  Normal rate.             Pulmonary/Chest: Effort normal. No respiratory distress.        Abdominal: Soft. There is no abdominal tenderness.           "   Musculoskeletal: Normal range of motion.      Right lower leg: No edema.      Left lower leg: No edema.       Neurological: She is alert.   No gross lesions noted.    Skin: Skin is warm and dry.    Psychiatric: She has a normal mood and affect. Her speech is normal and behavior is normal. Mood normal.        Recent Laboratory Results:    Pap smear from 9/27/2023 noted no cervical cell abnormalities and was HPV negative.  9/27/2023: Urine culture no growth    Results    Collected Updated Procedure    09/27/2023 1113 09/27/2023 2054 TSH [[id number]]   Blood    Component Value Units   TSH 1.373 uIU/mL          09/27/2023 1113 09/27/2023 2054 Estradiol [[id number]]   Blood    Component Value Units   Estradiol 40  pg/mL          09/27/2023 1113 10/13/2023 1533 Testosterone Panel [[id number]]   (Abnormal)   Blood    Component Value Units   Testosterone 140 High  C  ng/dL   Testosterone, Free 12.3 High  C  pg/mL   Testosterone, Bioavailable 24.7 High  C  ng/dL   Sex Hormone Binding Globulin 49 nmol/L   Albumin 4.4 C  g/dL          09/27/2023 1113 09/27/2023 2054 Follicle Stimulating Hormone [[id number]]   Blood    Component Value Units   Follicle Stimulating Hormone 41.21  mIU/mL          09/27/2023 1113 09/27/2023 2054 Luteinizing Hormone [[id number]]   Blood    Component Value Units   LH 14.8  mIU/mL                Assessment:        1. Menopausal disorder           Plan:      Menopausal disorder      No follow-ups on file.     The above was reviewed and discussed with the patient.    We reviewed the results of her laboratory evaluation.  In addition we discussed the fact that she is already retreating with an estrogen testosterone transdermal pellet.  In addition she reports a daily progesterone by mouth.  The patient will monitor herself over the next few months and continue either with her hormone therapy via pellets prescribed by a different physician or contact the office if she would like to adjust or move  towards a more traditional form of hormone replacement therapy.    The patient's questions were answered, and she is in agreement with the current plan.     Hemal Marcano MD  Department OBN  [facility]

## 2024-02-04 ENCOUNTER — HOSPITAL ENCOUNTER (OUTPATIENT)
Facility: HOSPITAL | Age: 54
Discharge: HOME | End: 2024-02-05
Attending: EMERGENCY MEDICINE | Admitting: INTERNAL MEDICINE
Payer: COMMERCIAL

## 2024-02-04 DIAGNOSIS — R07.89 ATYPICAL CHEST PAIN: ICD-10-CM

## 2024-02-04 DIAGNOSIS — R55 SYNCOPE, UNSPECIFIED SYNCOPE TYPE: Primary | ICD-10-CM

## 2024-02-04 PROBLEM — R07.9 CHEST PAIN: Status: ACTIVE | Noted: 2024-02-04

## 2024-02-04 LAB
ALBUMIN SERPL BCP-MCNC: 4.5 G/DL (ref 3.5–5.2)
ALP SERPL-CCNC: 43 U/L (ref 55–135)
ALT SERPL W/O P-5'-P-CCNC: 14 U/L (ref 10–44)
ANION GAP SERPL CALC-SCNC: 12 MMOL/L (ref 8–16)
AST SERPL-CCNC: 20 U/L (ref 10–40)
B-HCG UR QL: NEGATIVE
BASOPHILS # BLD AUTO: 0.03 K/UL (ref 0–0.2)
BASOPHILS NFR BLD: 0.4 % (ref 0–1.9)
BILIRUB SERPL-MCNC: 0.5 MG/DL (ref 0.1–1)
BILIRUB UR QL STRIP: NEGATIVE
BNP SERPL-MCNC: 24 PG/ML (ref 0–99)
BUN SERPL-MCNC: 21 MG/DL (ref 6–20)
CALCIUM SERPL-MCNC: 9.5 MG/DL (ref 8.7–10.5)
CHLORIDE SERPL-SCNC: 103 MMOL/L (ref 95–110)
CLARITY UR: CLEAR
CO2 SERPL-SCNC: 22 MMOL/L (ref 23–29)
COLOR UR: YELLOW
CREAT SERPL-MCNC: 0.8 MG/DL (ref 0.5–1.4)
CREAT SERPL-MCNC: 0.9 MG/DL (ref 0.5–1.4)
DIFFERENTIAL METHOD BLD: ABNORMAL
EOSINOPHIL # BLD AUTO: 0 K/UL (ref 0–0.5)
EOSINOPHIL NFR BLD: 0.5 % (ref 0–8)
ERYTHROCYTE [DISTWIDTH] IN BLOOD BY AUTOMATED COUNT: 12 % (ref 11.5–14.5)
ERYTHROCYTE [SEDIMENTATION RATE] IN BLOOD BY WESTERGREN METHOD: 8 MM/HR (ref 0–20)
EST. GFR  (NO RACE VARIABLE): >60 ML/MIN/1.73 M^2
FERRITIN SERPL-MCNC: 81.2 NG/ML (ref 20–300)
GLUCOSE SERPL-MCNC: 83 MG/DL (ref 70–110)
GLUCOSE UR QL STRIP: NEGATIVE
HCT VFR BLD AUTO: 39.8 % (ref 37–48.5)
HGB BLD-MCNC: 13.5 G/DL (ref 12–16)
HGB UR QL STRIP: NEGATIVE
IMM GRANULOCYTES # BLD AUTO: 0.01 K/UL (ref 0–0.04)
IMM GRANULOCYTES NFR BLD AUTO: 0.1 % (ref 0–0.5)
INFLUENZA A, MOLECULAR: NEGATIVE
INFLUENZA B, MOLECULAR: NEGATIVE
INR PPP: 0.9 (ref 0.8–1.2)
KETONES UR QL STRIP: NEGATIVE
LEUKOCYTE ESTERASE UR QL STRIP: NEGATIVE
LYMPHOCYTES # BLD AUTO: 1.7 K/UL (ref 1–4.8)
LYMPHOCYTES NFR BLD: 23.4 % (ref 18–48)
MAGNESIUM SERPL-MCNC: 2.1 MG/DL (ref 1.6–2.6)
MCH RBC QN AUTO: 32.1 PG (ref 27–31)
MCHC RBC AUTO-ENTMCNC: 33.9 G/DL (ref 32–36)
MCV RBC AUTO: 95 FL (ref 82–98)
MONOCYTES # BLD AUTO: 0.5 K/UL (ref 0.3–1)
MONOCYTES NFR BLD: 6.4 % (ref 4–15)
NEUTROPHILS # BLD AUTO: 5.1 K/UL (ref 1.8–7.7)
NEUTROPHILS NFR BLD: 69.2 % (ref 38–73)
NITRITE UR QL STRIP: NEGATIVE
NRBC BLD-RTO: 0 /100 WBC
PH UR STRIP: 6 [PH] (ref 5–8)
PLATELET # BLD AUTO: 233 K/UL (ref 150–450)
PMV BLD AUTO: 10.2 FL (ref 9.2–12.9)
POTASSIUM SERPL-SCNC: 3.3 MMOL/L (ref 3.5–5.1)
PROCALCITONIN SERPL IA-MCNC: <0.05 NG/ML (ref 0–0.5)
PROT SERPL-MCNC: 6.9 G/DL (ref 6–8.4)
PROT UR QL STRIP: NEGATIVE
PROTHROMBIN TIME: 10.5 SEC (ref 9–12.5)
RBC # BLD AUTO: 4.21 M/UL (ref 4–5.4)
SAMPLE: NORMAL
SARS-COV-2 RDRP RESP QL NAA+PROBE: NEGATIVE
SODIUM SERPL-SCNC: 137 MMOL/L (ref 136–145)
SP GR UR STRIP: 1.01 (ref 1–1.03)
SPECIMEN SOURCE: NORMAL
TROPONIN I SERPL HS-MCNC: 2.4 PG/ML (ref 0–14.9)
TROPONIN I SERPL HS-MCNC: 3 PG/ML (ref 0–14.9)
URN SPEC COLLECT METH UR: NORMAL
UROBILINOGEN UR STRIP-ACNC: NEGATIVE EU/DL
WBC # BLD AUTO: 7.36 K/UL (ref 3.9–12.7)

## 2024-02-04 PROCEDURE — 96361 HYDRATE IV INFUSION ADD-ON: CPT

## 2024-02-04 PROCEDURE — 84484 ASSAY OF TROPONIN QUANT: CPT | Performed by: STUDENT IN AN ORGANIZED HEALTH CARE EDUCATION/TRAINING PROGRAM

## 2024-02-04 PROCEDURE — 99285 EMERGENCY DEPT VISIT HI MDM: CPT | Mod: 25

## 2024-02-04 PROCEDURE — 25000003 PHARM REV CODE 250: Performed by: NURSE PRACTITIONER

## 2024-02-04 PROCEDURE — 81025 URINE PREGNANCY TEST: CPT | Performed by: NURSE PRACTITIONER

## 2024-02-04 PROCEDURE — G0378 HOSPITAL OBSERVATION PER HR: HCPCS

## 2024-02-04 PROCEDURE — 87502 INFLUENZA DNA AMP PROBE: CPT | Performed by: STUDENT IN AN ORGANIZED HEALTH CARE EDUCATION/TRAINING PROGRAM

## 2024-02-04 PROCEDURE — 82728 ASSAY OF FERRITIN: CPT | Performed by: NURSE PRACTITIONER

## 2024-02-04 PROCEDURE — 84484 ASSAY OF TROPONIN QUANT: CPT | Mod: 91 | Performed by: NURSE PRACTITIONER

## 2024-02-04 PROCEDURE — 83880 ASSAY OF NATRIURETIC PEPTIDE: CPT | Performed by: NURSE PRACTITIONER

## 2024-02-04 PROCEDURE — 94761 N-INVAS EAR/PLS OXIMETRY MLT: CPT

## 2024-02-04 PROCEDURE — 96372 THER/PROPH/DIAG INJ SC/IM: CPT | Performed by: STUDENT IN AN ORGANIZED HEALTH CARE EDUCATION/TRAINING PROGRAM

## 2024-02-04 PROCEDURE — 83735 ASSAY OF MAGNESIUM: CPT | Performed by: STUDENT IN AN ORGANIZED HEALTH CARE EDUCATION/TRAINING PROGRAM

## 2024-02-04 PROCEDURE — 85025 COMPLETE CBC W/AUTO DIFF WBC: CPT | Performed by: STUDENT IN AN ORGANIZED HEALTH CARE EDUCATION/TRAINING PROGRAM

## 2024-02-04 PROCEDURE — 86140 C-REACTIVE PROTEIN: CPT | Performed by: NURSE PRACTITIONER

## 2024-02-04 PROCEDURE — 81003 URINALYSIS AUTO W/O SCOPE: CPT | Performed by: NURSE PRACTITIONER

## 2024-02-04 PROCEDURE — 93005 ELECTROCARDIOGRAM TRACING: CPT | Performed by: GENERAL PRACTICE

## 2024-02-04 PROCEDURE — U0002 COVID-19 LAB TEST NON-CDC: HCPCS | Performed by: STUDENT IN AN ORGANIZED HEALTH CARE EDUCATION/TRAINING PROGRAM

## 2024-02-04 PROCEDURE — 63600175 PHARM REV CODE 636 W HCPCS: Performed by: NURSE PRACTITIONER

## 2024-02-04 PROCEDURE — 80053 COMPREHEN METABOLIC PANEL: CPT | Performed by: STUDENT IN AN ORGANIZED HEALTH CARE EDUCATION/TRAINING PROGRAM

## 2024-02-04 PROCEDURE — 85651 RBC SED RATE NONAUTOMATED: CPT | Performed by: NURSE PRACTITIONER

## 2024-02-04 PROCEDURE — 25500020 PHARM REV CODE 255: Performed by: STUDENT IN AN ORGANIZED HEALTH CARE EDUCATION/TRAINING PROGRAM

## 2024-02-04 PROCEDURE — 36415 COLL VENOUS BLD VENIPUNCTURE: CPT | Performed by: NURSE PRACTITIONER

## 2024-02-04 PROCEDURE — 84145 PROCALCITONIN (PCT): CPT | Performed by: NURSE PRACTITIONER

## 2024-02-04 PROCEDURE — 63600175 PHARM REV CODE 636 W HCPCS: Performed by: STUDENT IN AN ORGANIZED HEALTH CARE EDUCATION/TRAINING PROGRAM

## 2024-02-04 PROCEDURE — 93010 ELECTROCARDIOGRAM REPORT: CPT | Mod: ,,, | Performed by: GENERAL PRACTICE

## 2024-02-04 PROCEDURE — 85610 PROTHROMBIN TIME: CPT | Performed by: STUDENT IN AN ORGANIZED HEALTH CARE EDUCATION/TRAINING PROGRAM

## 2024-02-04 RX ORDER — SODIUM CHLORIDE 0.9 % (FLUSH) 0.9 %
10 SYRINGE (ML) INJECTION
Status: DISCONTINUED | OUTPATIENT
Start: 2024-02-04 | End: 2024-02-05 | Stop reason: HOSPADM

## 2024-02-04 RX ORDER — ONDANSETRON HYDROCHLORIDE 2 MG/ML
4 INJECTION, SOLUTION INTRAVENOUS EVERY 8 HOURS PRN
Status: DISCONTINUED | OUTPATIENT
Start: 2024-02-04 | End: 2024-02-05 | Stop reason: HOSPADM

## 2024-02-04 RX ORDER — FAMOTIDINE 20 MG/1
20 TABLET, FILM COATED ORAL DAILY
Status: DISCONTINUED | OUTPATIENT
Start: 2024-02-04 | End: 2024-02-05 | Stop reason: HOSPADM

## 2024-02-04 RX ORDER — HYDROCHLOROTHIAZIDE 12.5 MG/1
25 TABLET ORAL DAILY
Status: DISCONTINUED | OUTPATIENT
Start: 2024-02-05 | End: 2024-02-05 | Stop reason: HOSPADM

## 2024-02-04 RX ORDER — HYDROCODONE BITARTRATE AND ACETAMINOPHEN 5; 325 MG/1; MG/1
1 TABLET ORAL EVERY 6 HOURS PRN
Status: DISCONTINUED | OUTPATIENT
Start: 2024-02-04 | End: 2024-02-05 | Stop reason: HOSPADM

## 2024-02-04 RX ORDER — MELOXICAM 7.5 MG/1
15 TABLET ORAL DAILY
Status: DISCONTINUED | OUTPATIENT
Start: 2024-02-05 | End: 2024-02-05 | Stop reason: HOSPADM

## 2024-02-04 RX ORDER — SODIUM CHLORIDE, SODIUM LACTATE, POTASSIUM CHLORIDE, CALCIUM CHLORIDE 600; 310; 30; 20 MG/100ML; MG/100ML; MG/100ML; MG/100ML
INJECTION, SOLUTION INTRAVENOUS CONTINUOUS
Status: DISCONTINUED | OUTPATIENT
Start: 2024-02-04 | End: 2024-02-05 | Stop reason: HOSPADM

## 2024-02-04 RX ORDER — TALC
6 POWDER (GRAM) TOPICAL NIGHTLY PRN
Status: DISCONTINUED | OUTPATIENT
Start: 2024-02-04 | End: 2024-02-05 | Stop reason: HOSPADM

## 2024-02-04 RX ORDER — ENOXAPARIN SODIUM 100 MG/ML
1 INJECTION SUBCUTANEOUS
Status: COMPLETED | OUTPATIENT
Start: 2024-02-04 | End: 2024-02-04

## 2024-02-04 RX ADMIN — HYDROCODONE BITARTRATE AND ACETAMINOPHEN 1 TABLET: 5; 325 TABLET ORAL at 08:02

## 2024-02-04 RX ADMIN — ENOXAPARIN SODIUM 100 MG: 100 INJECTION SUBCUTANEOUS at 03:02

## 2024-02-04 RX ADMIN — IOHEXOL 100 ML: 350 INJECTION, SOLUTION INTRAVENOUS at 04:02

## 2024-02-04 RX ADMIN — FAMOTIDINE 20 MG: 20 TABLET ORAL at 07:02

## 2024-02-04 RX ADMIN — SODIUM CHLORIDE, POTASSIUM CHLORIDE, SODIUM LACTATE AND CALCIUM CHLORIDE: 600; 310; 30; 20 INJECTION, SOLUTION INTRAVENOUS at 07:02

## 2024-02-04 NOTE — ED PROVIDER NOTES
Encounter Date: 2/4/2024       History     Chief Complaint   Patient presents with    Leg Pain     LLE pain/ swelling seen at [facility], seen to ER r/o blood clot      54-year-old female past medical history of hearing impairment with cochlear implant presents emergency room today for evaluation of left lower extremity swelling and pain.  She was referred from urgent care after she presented for evaluation dizziness, anxiety and syncope.  At that time she did have chest pain shortness of breath.  She declines on my exam having chest pain shortness of breath now.  Tells me that leg swelling and pain began 3 days ago.  Patient recently returned from [place] September 2023, is on progesterone therapy.  No history of DVT.  No History of clotting disorders in her or her family.    The history is provided by the patient. No  was used.     Review of patient's allergies indicates:   Allergen Reactions    Milk containing products (dairy)      Past Medical History:   Diagnosis Date    Cochlear implant in place     Hearing impaired      Past Surgical History:   Procedure Laterality Date    IMPLANTATION OF COCHLEAR PROSTHESIS      PERCUTANEOUS TENOTOMY Right 09/29/2021    Procedure: TENOTOMY, PERCUTANEOUS HIP;  Surgeon: Oralia Finn DO;  Location: [facility] OR;  Service: Orthopedics;  Laterality: Right;  will bring own crutches/walker     Family History   Problem Relation Age of Onset    Skin cancer Mother      Social History     Tobacco Use    Smoking status: Never    Smokeless tobacco: Never   Substance Use Topics    Alcohol use: Yes    Drug use: Never     Review of Systems   Constitutional:  Negative for chills, fatigue and fever.   HENT:  Negative for congestion, hearing loss, sore throat and trouble swallowing.    Eyes:  Negative for visual disturbance.   Respiratory:  Positive for shortness of breath. Negative for cough and chest tightness.    Cardiovascular:  Positive for chest pain and leg  swelling.   Gastrointestinal:  Negative for abdominal pain and nausea.   Endocrine: Negative for polyuria.   Genitourinary:  Negative for difficulty urinating.   Musculoskeletal:  Negative for arthralgias and myalgias.   Skin:  Negative for rash.   Neurological:  Negative for dizziness and headaches.   Psychiatric/Behavioral:  Negative for agitation. The patient is nervous/anxious.        Physical Exam     Initial Vitals [02/04/24 1316]   BP Pulse Resp Temp SpO2   (!) 146/111 75 18 97.8 °F (36.6 °C) 99 %      MAP       --         Physical Exam    Nursing note and vitals reviewed.  Constitutional: She appears well-developed and well-nourished.   HENT:   Head: Normocephalic and atraumatic.   Eyes: Conjunctivae and EOM are normal.   Neck: Neck supple.   Cardiovascular:  Normal rate, regular rhythm, normal heart sounds and intact distal pulses.           Pulmonary/Chest: Breath sounds normal. No respiratory distress.   Musculoskeletal:      Cervical back: Neck supple.      Comments: Exam significant for:  Focal exam of the left lower extremity shows diffusely swollen TTP the deep venous system.  Positive Homans sign.  Right lower extremity unremarkable.    Otherwise skin is warm, dry, intact w/o effusions/edema/ erythema/ ecchymosis/ masses/lesions/ obvious deformities/ obvious muscle wasting. Grossly NVID.  Light touch sensation/motor intact distally to sural, saphenous, deep peroneal, superficial peroneal and tibial nerves.  Able to fire tibialis anterior, gastrocnemii soleus complex, EHL.  Dorsalis Pedis/Posterior Tibial Pulses 2+. Capillary Refill <2sec.    Essentially normal gait. Ambulates without assistance and is able to transfer without assistance. No obvious leg length discrepancy.      Sensation to Light Touch:  L        R  [2/2]  [2/2]  Medial mid-thigh (L2)  [2/2]  [2/2]  Medial knee (L3)  [2/2]  [2/2]  Medial malleolus (L4)  [2/2]  [2/2]  1st dorsal web space (L5)  [2/2]  [2/2]  Lateral plantar foot  (S1)    Motor Strength:   L        R  [5/5]  [5/5]  Hip Flexion (L2)  [5/5]  [5/5]  Knee Extension (L3)  [5/5]  [5/5]  Ankle Dorsiflexion (L4)  [5/5]  [5/5]  Hallux Dorsiflexion (L5)  [5/5]  [5/5]  Ankle Plantar Flexion (S1)       Neurological: She is alert and oriented to person, place, and time. GCS score is 15. GCS eye subscore is 4. GCS verbal subscore is 5. GCS motor subscore is 6.   Skin: Skin is warm and dry. Capillary refill takes less than 2 seconds.   Psychiatric: Her behavior is normal. Judgment and thought content normal.   Mildly anxious.         ED Course   Procedures  Labs Reviewed   CBC W/ AUTO DIFFERENTIAL - Abnormal; Notable for the following components:       Result Value    MCH 32.1 (*)     All other components within normal limits   COMPREHENSIVE METABOLIC PANEL - Abnormal; Notable for the following components:    Potassium 3.3 (*)     CO2 22 (*)     BUN 21 (*)     Alkaline Phosphatase 43 (*)     All other components within normal limits   PROTIME-INR   MAGNESIUM   TROPONIN I HIGH SENSITIVITY   B-TYPE NATRIURETIC PEPTIDE   SARS-COV-2 RNA AMPLIFICATION, QUAL   INFLUENZA A AND B ANTIGEN    Narrative:     Specimen Source->Nasopharyngeal Swab   SEDIMENTATION RATE   PROCALCITONIN   FERRITIN   URINALYSIS, REFLEX TO URINE CULTURE    Narrative:     Specimen Source->Urine   PREGNANCY TEST, URINE RAPID    Narrative:     Specimen Source->Urine   B-TYPE NATRIURETIC PEPTIDE   TROPONIN I HIGH SENSITIVITY    Narrative:     In order to access the algorithm for troponin high  sensitivity orders access the address below:  [email or web address]   C-REACTIVE PROTEIN   ISTAT CREATININE   POCT CREATININE        ECG Results              EKG 12-lead (In process)  Result time 02/04/24 16:19:42      In process by Interface, Lab In [facility] (02/04/24 16:19:42)                   Narrative:    Test Reason : R55,    Vent. Rate : 075 BPM     Atrial Rate : 075 BPM     P-R Int  : 140 ms          QRS Dur : 100 ms      QT Int : 402 ms       P-R-T Axes : 075 061 042 degrees     QTc Int : 448 ms    Normal sinus rhythm  Normal ECG  No previous ECGs available    Referred By: [email or web address]   SELF           Confirmed By:                       In process by Interface, Lab In [facility] (02/04/24 16:16:23)                   Narrative:    Test Reason : R55,    Vent. Rate : 075 BPM     Atrial Rate : 075 BPM     P-R Int : 140 ms          QRS Dur : 100 ms      QT Int : 402 ms       P-R-T Axes : 075 061 042 degrees     QTc Int : 448 ms    Normal sinus rhythm  Normal ECG  No previous ECGs available    Referred By: [email or web address]   SELF           Confirmed By:                                   Imaging Results              CTA Chest Non-Coronary (PE Studies) (Final result)  Result time 02/04/24 16:26:12      Final result by Estuardo Balbuena MD (02/04/24 16:26:12)                   Narrative:    CMS MANDATED QUALITY DATA - CT RADIATION - 436    One or more of the following dose-optimizing techniques was utilized for this exam: automated exposure control, adjustment of the mA and/or kV according to patient size, and/or use of iterative reconstruction technique.        HISTORY:Pulmonary embolism suspected, positive d-dimer.    TECHNIQUE: Serial axial images were obtained from the lung apex through the lung base with 100 cc of Omnipaque 350 intravenous contrast utilizing a CT angiography protocol. Coronal and sagittal MIP CT angiography reconstructions were performed. Patient received approximately 418 mGy-cm of radiation exposure from this exam.    COMPARISON:No previous study for comparison.    FINDINGS:No mediastinal or hilar adenopathy is seen. No pericardial effusion is noted. No evidence of pulmonary embolus is identified. No focal pulmonary consolidation or pleural effusion is seen. Mild bilateral posterior basilar atelectasis is present. Bilateral nonspecific groundglass opacity is noted in the portion lower  lobes. Gallbladder appears nondistended.    IMPRESSION:    1. No evidence of pulmonary embolus.  2. No acute intrathoracic abnormality noted.                Electronically signed by:  Estuardo Balbuena MD  02/04/2024 04:26 PM CST Workstation: [id number]                                     US Lower Extremity Veins Left (Final result)  Result time 02/04/24 15:30:15      Final result by Jerome Abrams MD (02/04/24 15:30:15)                   Narrative:    REASON: Pain.    FINDINGS:    Grayscale, color and spectral Doppler analysis of the left lower extremity deep venous system was performed.    There is normal compressibility, color and spectral Doppler analysis, and augmentation in the left lower extremity deep venous system.    IMPRESSION:    No DVT of the left lower extremity veins.    Electronically signed by:  Jerome Abrams DO  02/04/2024 03:30 PM CST Workstation: [id number]                                     Medications   hydroCHLOROthiazide tablet 25 mg (has no administration in time range)   meloxicam tablet 15 mg (has no administration in time range)   sodium chloride 0.9% flush 10 mL (has no administration in time range)   melatonin tablet 6 mg (has no administration in time range)   lactated ringers infusion ( Intravenous New Bag 2/4/24 1917)   HYDROcodone-acetaminophen 5-325 mg per tablet 1 tablet (has no administration in time range)   famotidine tablet 20 mg (20 mg Oral Given 2/4/24 1922)   ondansetron injection 4 mg (has no administration in time range)   enoxaparin injection 100 mg (100 mg Subcutaneous Given 2/4/24 1525)   iohexoL (OMNIPAQUE 350) injection 100 mL (100 mLs Intravenous Given 2/4/24 1603)     Medical Decision Making  Patient presents for emergent evaluation of left lower extremity pain as a referral from urgent care for elevated D-dimer. Workup today consistent with likely syncope of unknown etiology.  Differential includes cardiogenic syncope, pulmonary embolus, lower extremity DVT,  ACS.  Consider but less likely aortic aneurysm or dissection  Patient is nontoxic and well appearing, Afebrile with stable vital signs initially.  Patient reports continued pain, does tell me she had a syncopal event that was preceded by chest pain or shortness of breath.  During her emergency room stay she had a brief episode of hypoxia to 81% on room air that improved with 2 L nasal cannula.  Labs were ordered and documented in the ED course.  Mild hypokalemia otherwise no significant metabolic abnormality, anemia or evidence of infection.  Imaging was ordered and documented in the ED course.  Left lower extremity ultrasound and PE study of the chest negative for acute thrombus/embolus.  I discussed patient case with consultant [place], [facility] medicine nurse practitioner.  She agrees to evaluate for admission.    The treatment they received in the emergency department included 1 milligram/kilogram Lovenox injection for suspected DVT/DVT prophylaxis.    Amount and/or Complexity of Data Reviewed  Labs: ordered. Decision-making details documented in ED Course.  Radiology: ordered. Decision-making details documented in ED Course.  ECG/medicine tests:  Decision-making details documented in ED Course.    Risk  Prescription drug management.  Decision regarding hospitalization.               ED Course as of 02/04/24 2050   Sun Feb 04, 2024   1421 BP(!): 146/111 [AN]   1421 Temp: 97.8 °F (36.6 °C) [AN]   1421 Pulse: 75 [AN]   1421 Resp: 18 [AN]   1421 SpO2: 99 % [AN]   1430 EKG 12-lead  EKG independently interpreted by me.  Demonstrates sinus rhythm of 76 beats per minute.  Normal axis, normal intervals.  No acute ST elevation, depression or T-wave inversion to suggest ischemia.  No STEMI. [AN]   1707 US Lower Extremity Veins Left  No DVT of the left lower extremity veins. [AN]   1707 CTA Chest Non-Coronary (PE Studies)  1. No evidence of pulmonary embolus.  2. No acute intrathoracic abnormality noted. [AN]   1707 CBC auto  differential(!)  No leukocytosis.  No significant anemia.  Normal platelets. [AN]   1707 Comprehensive metabolic panel(!)  Mild hypokalemia.  No other metabolic abnormality.  Normal hepatic and renal function. [AN]   1707 Troponin I High Sensitivity: 3.0 [AN]   1707 Magnesium : 2.1 [AN]   1707 Protime-INR [AN]   1707 I discussed patient case with Linn, [facility] medicine nurse practitioner.  She agrees to evaluate patient for admission. [AN]      ED Course User Index  [AN] Andrea De PA-C                             Clinical Impression:  Final diagnoses:  [R55] Syncope, unspecified syncope type (Primary)          ED Disposition Condition    Observation                 Andrea De PA-C  02/04/24 2050

## 2024-02-04 NOTE — H&P
[facility] History & Physical Examination   Patient Name: Corinne Miller Wingerter  MRN: [id number]  Patient Class: Emergency   Admission Date: 2/4/2024  1:12 PM  Length of Stay: 0  Attending Physician:   Primary Care Provider: Stacie Sterling NP  Face-to-Face encounter date: 02/04/2024  Code Status:Full Code  MPOA:  Chief Complaint: Leg Pain (LLE pain/ swelling seen at Urgent Care PTA, seen to ER r/o blood clot )        Patient information was obtained from patient, past medical records and ER records.   HISTORY OF PRESENT ILLNESS:   Corinne Miller Wingerter is a 54 y.o. old female who  has a past medical history of Cochlear implant in place and Hearing impaired.. The patient presented to [facility] on 2/4/2024 with a primary complaint of Leg Pain (LLE pain/ swelling seen at Urgent Care PTA, seen to ER r/o blood clot )  .   54-year-old  female presents to the emergency room with complaints of near syncope.    The patient states while she was sitting in Spiritism she suddenly became diaphoretic and lightheaded.  She denied actual dizziness or syncope.  The patient states she stood up and started fanning herself and her symptoms improved.  The patient did state that she had episode of chest pain shortness on breath with this near-syncope episode This concerned her so she went to a local urgent care clinic.  A D-dimer was performed that was positive and she was instructed to come to the emergency room for further evaluation    In the emergency room a CT of the chest was performed that was negative.  The patient was also complaining of bilateral lower extremity swelling and left knee swelling.  Ultrasound of her lower extremity on the left was negative for DVT    A BNP was ordered and the results pending    We will trend troponins get echo in a.m. if negative discharge    Past medical history significant for hearing impairment with Cochlear implant  HTN  postmenopausal    REVIEW OF SYSTEMS:   10 Point Review of System was performed and was found to be negative except for that mentioned already in the HPI and   Review of Systems (Negative unless checked off)  Review of Systems   Constitutional:  Positive for diaphoresis.   HENT: Negative.     Eyes: Negative.    Respiratory:  Positive for shortness of breath.    Cardiovascular: Negative.    Gastrointestinal: Negative.    Genitourinary: Negative.    Musculoskeletal: Negative.    Skin: Negative.    Neurological:  Positive for dizziness and weakness.        Near syncope    Endo/Heme/Allergies: Negative.    Psychiatric/Behavioral:  The patient is nervous/anxious.            PAST MEDICAL HISTORY:     Past Medical History:   Diagnosis Date    Cochlear implant in place     Hearing impaired        PAST SURGICAL HISTORY:     Past Surgical History:   Procedure Laterality Date    IMPLANTATION OF COCHLEAR PROSTHESIS      PERCUTANEOUS TENOTOMY Right 09/29/2021    Procedure: TENOTOMY, PERCUTANEOUS HIP;  Surgeon: Oralia Finn DO;  Location: [facility];  Service: Orthopedics;  Laterality: Right;  will bring own crutches/walker       ALLERGIES:   Milk containing products (dairy)    FAMILY HISTORY:     Family History   Problem Relation Age of Onset    Skin cancer Mother        SOCIAL HISTORY:     Social History     Tobacco Use    Smoking status: Never    Smokeless tobacco: Never   Substance Use Topics    Alcohol use: Yes        Social History     Substance and Sexual Activity   Sexual Activity Yes        HOME MEDICATIONS:     Prior to Admission medications    Medication Sig Start Date End Date Taking? Authorizing Provider   clindamycin phosphate 1% (CLINDAGEL) 1 % gel Apply ACNE BID  Patient taking differently: Apply 1 application  topically 2 (two) times daily. Apply ACNE BID 12/19/23  Yes Cricket Ellison MD   clobetasoL (TEMOVATE) 0.05 % cream Aaa bid x 3 wks, tk 1 wk off FOR HAND ECZEMA/CONTACT DERMATITIS  Patient taking  "differently: Apply 1 application  topically. Aaa bid x 3 wks, tk 1 wk off FOR HAND ECZEMA/CONTACT DERMATITIS 12/19/23  Yes Cricket Ellison MD   cyclobenzaprine (FLEXERIL) 5 MG tablet Take 5 mg by mouth every evening. 8/31/23  Yes Provider, Historical   hydroCHLOROthiazide (HYDRODIURIL) 25 MG tablet Take 25 mg by mouth once daily. 12/18/23  Yes Provider, Historical   meloxicam (MOBIC) 15 MG tablet Take 15 mg by mouth once daily. 9/11/23  Yes Provider, Historical   progesterone (PROMETRIUM) 200 MG capsule Take 400 mg by mouth every evening. 11/16/23  Yes Provider, Historical   ketoconazole (NIZORAL) 2 % cream  9/11/23   Provider, Historical   clotrimazole-betamethasone 1-0.05% (LOTRISONE) cream APPLY A SMALL AMOUNT AA BID FOR 7 TO 10 DAYS 5/25/19 2/4/24  Provider, Historical   fluconazole (DIFLUCAN) 150 MG Tab Take by mouth. 9/1/23 2/4/24  Provider, Historical         PHYSICAL EXAM:   /71   Pulse 65   Temp 97.8 °F (36.6 °C) (Oral)   Resp 13   Ht 5' 6" (1.676 m)   Wt 95.3 kg (210 lb)   LMP 08/29/2020   SpO2 100%   BMI 33.89 kg/m²   Vitals Reviewed  General appearance: Well-developed, well-nourished female in no apparent distress.  Skin: No Rash.   Neuro: Motor and sensory exams grossly intact. Good tone. Power in all 4 extremities 5/5.   HENT: Atraumatic head. Moist mucous membranes of oral cavity.  Eyes: Normal extraocular movements.   Neck: Supple. No evidence of lymphadenopathy. No thyroidomegaly.  Lungs: Clear to auscultation bilaterally. No wheezing present.   Heart: Regular rate and rhythm. S1 and S2 present with no murmurs/gallop/rub. No pedal edema. No JVD present.   Abdomen: Soft, non-distended, non-tender. No rebound tenderness/guarding. No masses or organomegaly. Bowel sounds are normal. Bladder is not palpable.   Extremities: No cyanosis, clubbing, or edema.  Psych/mental status: Alert and oriented. Cooperative. Responds appropriately to questions.   EMERGENCY DEPARTMENT LABS AND IMAGING: " "  Following labs were Reviewed   Recent Labs   Lab 02/04/24  1505   WBC 7.36   HGB 13.5   HCT 39.8      CALCIUM 9.5   ALBUMIN 4.5   PROT 6.9      K 3.3*   CO2 22*      BUN 21*   CREATININE 0.9   ALKPHOS 43*   ALT 14   AST 20   BILITOT 0.5         BMP:   Recent Labs   Lab 02/04/24  1505   GLU 83      K 3.3*      CO2 22*   BUN 21*   CREATININE 0.9   CALCIUM 9.5   MG 2.1   , CMP   Recent Labs   Lab 02/04/24  1505      K 3.3*      CO2 22*   GLU 83   BUN 21*   CREATININE 0.9   CALCIUM 9.5   PROT 6.9   ALBUMIN 4.5   BILITOT 0.5   ALKPHOS 43*   AST 20   ALT 14   ANIONGAP 12   , CBC   Recent Labs   Lab 02/04/24  1505   WBC 7.36   HGB 13.5   HCT 39.8      , INR   Lab Results   Component Value Date    INR 0.9 02/04/2024   , Lipid Panel No results found for: "CHOL", "HDL", "LDLCALC", "TRIG", "CHOLHDL", Troponin No results for input(s): "TROPONINI" in the last 168 hours., A1C: No results for input(s): "HGBA1C" in the last 4320 hours., and All labs within the past 24 hours have been reviewed  Microbiology Results (last 7 days)       ** No results found for the last 168 hours. **          CTA Chest Non-Coronary (PE Studies)   Final Result      US Lower Extremity Veins Left   Final Result      CTA Chest Non-Coronary (PE Studies)    Result Date: 2/4/2024  CMS MANDATED QUALITY DATA - CT RADIATION - 436 One or more of the following dose-optimizing techniques was utilized for this exam: automated exposure control, adjustment of the mA and/or kV according to patient size, and/or use of iterative reconstruction technique. HISTORY:Pulmonary embolism suspected, positive d-dimer. TECHNIQUE: Serial axial images were obtained from the lung apex through the lung base with 100 cc of Omnipaque 350 intravenous contrast utilizing a CT angiography protocol. Coronal and sagittal MIP CT angiography reconstructions were performed. Patient received approximately 418 mGy-cm of radiation exposure from " this exam. COMPARISON:No previous study for comparison. FINDINGS:No mediastinal or hilar adenopathy is seen. No pericardial effusion is noted. No evidence of pulmonary embolus is identified. No focal pulmonary consolidation or pleural effusion is seen. Mild bilateral posterior basilar atelectasis is present. Bilateral nonspecific groundglass opacity is noted in the portion lower lobes. Gallbladder appears nondistended. IMPRESSION: 1. No evidence of pulmonary embolus. 2. No acute intrathoracic abnormality noted. Electronically signed by:  Estuardo Balbuena MD  02/04/2024 04:26 PM CST Workstation: [id number]    US Lower Extremity Veins Left    Result Date: 2/4/2024  REASON: Pain. FINDINGS: Grayscale, color and spectral Doppler analysis of the left lower extremity deep venous system was performed. There is normal compressibility, color and spectral Doppler analysis, and augmentation in the left lower extremity deep venous system. IMPRESSION: No DVT of the left lower extremity veins. Electronically signed by:  Jerome Abrams DO  02/04/2024 03:30 PM CST Workstation: [id number]      I personally reviewed and agree with the radiologist's findings      12 lead EKG reveals a normal sinus rhythm with a normal axis this good R-wave progression this no significant ST or T-wave abnormalities rate 75  milliseconds  ASSESSMENT & PLAN:   Corinne Miller Wingerter is a 54 y.o. female admitted for    Near Syncope/atypical chest pain  -orthostatic vital signs  -CTA negative PE  -check BNP and troponin  -low-molecular weight heparin for now  -ultrasound of left lower extremity negative for DVT  -with ultrasound right lower extremity  -the patient is on replacement therapy  -we will get an echo with bubble study  -trend troponins  -as stable discharge in a.m.      2. Bilateral lower extremity edema  -ultrasound negative for DVT on the left  -started on low-molecular weight heparin  -BNP pending    3. Essential HTN  -continue home  medications to manage  -patient does admit to eating a salty diet for the past 2 days      4. Postmenopausal   -patient on pellet therapy  -high risk for DVT    5. Anxiety  -continue home medications to manage    6. Hearing impairment/cochlear hearing implant  - Hearing loss occurred as a child after a upper respiratory/ear infection several years  -chronic and stable    7. Mild Hypokalemia  - replacement given in ED        DVT Prophylaxis: will be placed on Lovenox for DVT prophylaxis and will be advised to be as mobile as possible and sit in a chair as tolerated.   ____________________________________________________________  Face-to-Face encounter date: 02/04/2024  Encounter included review of the medical records, interviewing and examining the patient face-to-face, discussion with family and other health care providers including emergency medicine physician, admission orders, interpreting lab/test results and formulating a plan of care.   Medical Decision Making during this encounter was  [_] Low Complexity  [_] Moderate Complexity  [x] High Complexity  _________________________________________________________________________________    INPATIENT LIST OF MEDICATIONS   No current facility-administered medications for this encounter.    Current Outpatient Medications:     clindamycin phosphate 1% (CLINDAGEL) 1 % gel, Apply ACNE BID (Patient taking differently: Apply 1 application  topically 2 (two) times daily. Apply ACNE BID), Disp: 60 g, Rfl: 11    clobetasoL (TEMOVATE) 0.05 % cream, Aaa bid x 3 wks, tk 1 wk off FOR HAND ECZEMA/CONTACT DERMATITIS (Patient taking differently: Apply 1 application  topically. Aaa bid x 3 wks, tk 1 wk off FOR HAND ECZEMA/CONTACT DERMATITIS), Disp: 60 g, Rfl: 2    cyclobenzaprine (FLEXERIL) 5 MG tablet, Take 5 mg by mouth every evening., Disp: , Rfl:     hydroCHLOROthiazide (HYDRODIURIL) 25 MG tablet, Take 25 mg by mouth once daily., Disp: , Rfl:     meloxicam (MOBIC) 15 MG tablet,  Take 15 mg by mouth once daily., Disp: , Rfl:     progesterone (PROMETRIUM) 200 MG capsule, Take 400 mg by mouth every evening., Disp: , Rfl:     ketoconazole (NIZORAL) 2 % cream, , Disp: , Rfl:       Scheduled Meds:  Continuous Infusions:  PRN Meds:.      Valerie Fuller  [facility] Hospitalist NP  02/04/2024

## 2024-02-05 ENCOUNTER — CLINICAL SUPPORT (OUTPATIENT)
Dept: CARDIOLOGY | Facility: HOSPITAL | Age: 54
End: 2024-02-05
Attending: EMERGENCY MEDICINE
Payer: COMMERCIAL

## 2024-02-05 VITALS
DIASTOLIC BLOOD PRESSURE: 80 MMHG | SYSTOLIC BLOOD PRESSURE: 134 MMHG | TEMPERATURE: 98 F | HEIGHT: 66 IN | HEART RATE: 65 BPM | OXYGEN SATURATION: 99 % | RESPIRATION RATE: 15 BRPM | WEIGHT: 207.25 LBS | BODY MASS INDEX: 33.31 KG/M2

## 2024-02-05 VITALS — HEIGHT: 66 IN | BODY MASS INDEX: 33.31 KG/M2 | WEIGHT: 207.25 LBS

## 2024-02-05 LAB
ALBUMIN SERPL BCP-MCNC: 3.8 G/DL (ref 3.5–5.2)
ALP SERPL-CCNC: 39 U/L (ref 55–135)
ALT SERPL W/O P-5'-P-CCNC: 12 U/L (ref 10–44)
ANION GAP SERPL CALC-SCNC: 5 MMOL/L (ref 8–16)
AORTIC ROOT ANNULUS: 3 CM
AORTIC VALVE CUSP SEPERATION: 1.9 CM
AST SERPL-CCNC: 16 U/L (ref 10–40)
AV INDEX (PROSTH): 0.83
AV MEAN GRADIENT: 2 MMHG
AV PEAK GRADIENT: 4 MMHG
AV VALVE AREA BY VELOCITY RATIO: 2.53 CM²
AV VALVE AREA: 2.59 CM²
AV VELOCITY RATIO: 0.81
BASOPHILS # BLD AUTO: 0.02 K/UL (ref 0–0.2)
BASOPHILS NFR BLD: 0.3 % (ref 0–1.9)
BILIRUB SERPL-MCNC: 0.3 MG/DL (ref 0.1–1)
BSA FOR ECHO PROCEDURE: 2.09 M2
BUN SERPL-MCNC: 19 MG/DL (ref 6–20)
CALCIUM SERPL-MCNC: 8.5 MG/DL (ref 8.7–10.5)
CHLORIDE SERPL-SCNC: 105 MMOL/L (ref 95–110)
CO2 SERPL-SCNC: 27 MMOL/L (ref 23–29)
CREAT SERPL-MCNC: 0.9 MG/DL (ref 0.5–1.4)
CRP SERPL-MCNC: 1.7 MG/L (ref 0–8.2)
CV ECHO LV RWT: 0.41 CM
DIFFERENTIAL METHOD BLD: ABNORMAL
DOP CALC AO PEAK VEL: 1.03 M/S
DOP CALC AO VTI: 21.3 CM
DOP CALC LVOT AREA: 3.1 CM2
DOP CALC LVOT DIAMETER: 2 CM
DOP CALC LVOT PEAK VEL: 0.83 M/S
DOP CALC LVOT STROKE VOLUME: 55.26 CM3
DOP CALC MV VTI: 27.8 CM
DOP CALCLVOT PEAK VEL VTI: 17.6 CM
E WAVE DECELERATION TIME: 229 MSEC
E/A RATIO: 2.53
E/E' RATIO: 6.86 M/S
ECHO LV POSTERIOR WALL: 0.99 CM (ref 0.6–1.1)
EOSINOPHIL # BLD AUTO: 0.1 K/UL (ref 0–0.5)
EOSINOPHIL NFR BLD: 1 % (ref 0–8)
ERYTHROCYTE [DISTWIDTH] IN BLOOD BY AUTOMATED COUNT: 12.2 % (ref 11.5–14.5)
EST. GFR  (NO RACE VARIABLE): >60 ML/MIN/1.73 M^2
FRACTIONAL SHORTENING: 29 % (ref 28–44)
GLUCOSE SERPL-MCNC: 95 MG/DL (ref 70–110)
HCT VFR BLD AUTO: 38.3 % (ref 37–48.5)
HGB BLD-MCNC: 12.7 G/DL (ref 12–16)
IMM GRANULOCYTES # BLD AUTO: 0.01 K/UL (ref 0–0.04)
IMM GRANULOCYTES NFR BLD AUTO: 0.2 % (ref 0–0.5)
INTERVENTRICULAR SEPTUM: 0.88 CM (ref 0.6–1.1)
IVC DIAMETER: 2.05 CM
LEFT ATRIUM SIZE: 2.9 CM
LEFT INTERNAL DIMENSION IN SYSTOLE: 3.46 CM (ref 2.1–4)
LEFT VENTRICLE DIASTOLIC VOLUME INDEX: 54.19 ML/M2
LEFT VENTRICLE DIASTOLIC VOLUME: 110 ML
LEFT VENTRICLE MASS INDEX: 78 G/M2
LEFT VENTRICLE SYSTOLIC VOLUME INDEX: 24.4 ML/M2
LEFT VENTRICLE SYSTOLIC VOLUME: 49.5 ML
LEFT VENTRICULAR INTERNAL DIMENSION IN DIASTOLE: 4.84 CM (ref 3.5–6)
LEFT VENTRICULAR MASS: 157.62 G
LV LATERAL E/E' RATIO: 6.4 M/S
LV SEPTAL E/E' RATIO: 7.38 M/S
LVOT MG: 1 MMHG
LVOT MV: 0.55 CM/S
LYMPHOCYTES # BLD AUTO: 1.5 K/UL (ref 1–4.8)
LYMPHOCYTES NFR BLD: 23.8 % (ref 18–48)
MCH RBC QN AUTO: 32.3 PG (ref 27–31)
MCHC RBC AUTO-ENTMCNC: 33.2 G/DL (ref 32–36)
MCV RBC AUTO: 98 FL (ref 82–98)
MONOCYTES # BLD AUTO: 0.6 K/UL (ref 0.3–1)
MONOCYTES NFR BLD: 9.8 % (ref 4–15)
MV MEAN GRADIENT: 1 MMHG
MV PEAK A VEL: 0.38 M/S
MV PEAK E VEL: 0.96 M/S
MV PEAK GRADIENT: 3 MMHG
MV STENOSIS PRESSURE HALF TIME: 104 MS
MV VALVE AREA BY CONTINUITY EQUATION: 1.99 CM2
MV VALVE AREA P 1/2 METHOD: 2.12 CM2
NEUTROPHILS # BLD AUTO: 4.1 K/UL (ref 1.8–7.7)
NEUTROPHILS NFR BLD: 64.9 % (ref 38–73)
NRBC BLD-RTO: 0 /100 WBC
OHS LV EJECTION FRACTION SIMPSONS BIPLANE MOD: 45 %
PLATELET # BLD AUTO: 217 K/UL (ref 150–450)
PMV BLD AUTO: 10.3 FL (ref 9.2–12.9)
POTASSIUM SERPL-SCNC: 3.8 MMOL/L (ref 3.5–5.1)
PROT SERPL-MCNC: 6.1 G/DL (ref 6–8.4)
PV MV: 0.49 M/S
PV PEAK GRADIENT: 2 MMHG
PV PEAK VELOCITY: 0.68 M/S
RA PRESSURE ESTIMATED: 3 MMHG
RBC # BLD AUTO: 3.93 M/UL (ref 4–5.4)
RIGHT VENTRICULAR END-DIASTOLIC DIMENSION: 2.73 CM
RV TISSUE DOPPLER FREE WALL SYSTOLIC VELOCITY 1 (APICAL 4 CHAMBER VIEW): 12.9 CM/S
SODIUM SERPL-SCNC: 137 MMOL/L (ref 136–145)
TDI LATERAL: 0.15 M/S
TDI SEPTAL: 0.13 M/S
TDI: 0.14 M/S
TROPONIN I SERPL HS-MCNC: 3.4 PG/ML (ref 0–14.9)
WBC # BLD AUTO: 6.31 K/UL (ref 3.9–12.7)
Z-SCORE OF LEFT VENTRICULAR DIMENSION IN END DIASTOLE: -2.17
Z-SCORE OF LEFT VENTRICULAR DIMENSION IN END SYSTOLE: -0.5

## 2024-02-05 PROCEDURE — 93306 TTE W/DOPPLER COMPLETE: CPT

## 2024-02-05 PROCEDURE — 36415 COLL VENOUS BLD VENIPUNCTURE: CPT | Performed by: NURSE PRACTITIONER

## 2024-02-05 PROCEDURE — 93306 TTE W/DOPPLER COMPLETE: CPT | Mod: 26,,, | Performed by: INTERNAL MEDICINE

## 2024-02-05 PROCEDURE — 80053 COMPREHEN METABOLIC PANEL: CPT | Performed by: NURSE PRACTITIONER

## 2024-02-05 PROCEDURE — 25000003 PHARM REV CODE 250: Performed by: STUDENT IN AN ORGANIZED HEALTH CARE EDUCATION/TRAINING PROGRAM

## 2024-02-05 PROCEDURE — 84484 ASSAY OF TROPONIN QUANT: CPT | Performed by: NURSE PRACTITIONER

## 2024-02-05 PROCEDURE — G0378 HOSPITAL OBSERVATION PER HR: HCPCS

## 2024-02-05 PROCEDURE — 25000003 PHARM REV CODE 250: Performed by: NURSE PRACTITIONER

## 2024-02-05 PROCEDURE — 96374 THER/PROPH/DIAG INJ IV PUSH: CPT | Mod: 59

## 2024-02-05 PROCEDURE — 85025 COMPLETE CBC W/AUTO DIFF WBC: CPT | Performed by: NURSE PRACTITIONER

## 2024-02-05 PROCEDURE — 96361 HYDRATE IV INFUSION ADD-ON: CPT

## 2024-02-05 PROCEDURE — 63600175 PHARM REV CODE 636 W HCPCS: Performed by: NURSE PRACTITIONER

## 2024-02-05 PROCEDURE — 94761 N-INVAS EAR/PLS OXIMETRY MLT: CPT

## 2024-02-05 RX ORDER — ACETAMINOPHEN 325 MG/1
650 TABLET ORAL EVERY 6 HOURS PRN
Status: DISCONTINUED | OUTPATIENT
Start: 2024-02-05 | End: 2024-02-05 | Stop reason: HOSPADM

## 2024-02-05 RX ORDER — IBUPROFEN 400 MG/1
400 TABLET ORAL EVERY 6 HOURS PRN
Status: DISCONTINUED | OUTPATIENT
Start: 2024-02-05 | End: 2024-02-05 | Stop reason: HOSPADM

## 2024-02-05 RX ADMIN — ACETAMINOPHEN 650 MG: 325 TABLET ORAL at 01:02

## 2024-02-05 RX ADMIN — MELOXICAM 15 MG: 7.5 TABLET ORAL at 09:02

## 2024-02-05 RX ADMIN — SODIUM CHLORIDE, POTASSIUM CHLORIDE, SODIUM LACTATE AND CALCIUM CHLORIDE: 600; 310; 30; 20 INJECTION, SOLUTION INTRAVENOUS at 05:02

## 2024-02-05 RX ADMIN — ONDANSETRON 4 MG: 2 INJECTION INTRAMUSCULAR; INTRAVENOUS at 01:02

## 2024-02-05 RX ADMIN — IBUPROFEN 400 MG: 400 TABLET, FILM COATED ORAL at 05:02

## 2024-02-05 RX ADMIN — FAMOTIDINE 20 MG: 20 TABLET ORAL at 08:02

## 2024-02-05 NOTE — PLAN OF CARE
Discharge orders and chart reviewed. No other discharge needs noted at this time. Pt is clear for discharge from case management, after echo read by cardiologist and reviewed by WASHINGTON Mena. Pt is discharging to home.    Follow up apt scheduled and added to AVS     02/05/24 0967   Final Note   Assessment Type Final Discharge Note   Anticipated Discharge Disposition Home   What phone number can be called within the next 1-3 days to see how you are doing after discharge? [phone]   Hospital Resources/Appts/Education Provided Appointments scheduled and added to AVS   Post-Acute Status   Discharge Delays (!) Procedure Scheduling (IR, OR, Labs, Echo, Cath, Echo, EEG)

## 2024-02-05 NOTE — HOSPITAL COURSE
Patient was monitored closely during her hospital stay.  She underwent CTA chest which revealed no evidence of pulmonary embolism.  She underwent left lower extremity ultrasound which was negative for DVT.  Her left knee pain and swelling improved and she ambulated without difficulty.  She underwent echocardiogram which was negative for acute findings.  She denied chest pain.  Troponin was trended and remained negative.  Patient felt well and desired to go home.  She was cleared for discharge.  Patient verbalized understanding of discharge instructions and return precautions.    Physical exam:  Awake alert orient x3, pleasant, no acute distress   Heart-regular rate rhythm, no edema   Lungs-Clear to auscultation bilaterally, respirations even unlabored   Abdomen-soft nontender normoactive bowel sounds   Extremities-moving all equally without any focal deficits

## 2024-02-05 NOTE — DISCHARGE SUMMARY
[facility]  Discharge Summary      Patient Name: Corinne Miller Wingerter  MRN: [id number]  ABIODUN: [id number]  Patient Class: OP- Observation  Admission Date: 2/4/2024  Hospital Length of Stay: 0 days  Discharge Date and Time: 2/5/2024  4:36 PM  Attending Physician: No att. providers found   Discharging Provider: Trina Fragoso NP  Primary Care Provider: Stacie Sterling NP    Primary Care Team: Networked reference to record PCT     HPI:   No notes on file    * No surgery found *      Hospital Course:   Patient was monitored closely during her hospital stay.  She underwent CTA chest which revealed no evidence of pulmonary embolism.  She underwent left lower extremity ultrasound which was negative for DVT.  Her left knee pain and swelling improved and she ambulated without difficulty.  She underwent echocardiogram which was negative for acute findings.  She denied chest pain.  Troponin was trended and remained negative.  Patient felt well and desired to go home.  She was cleared for discharge.  Patient verbalized understanding of discharge instructions and return precautions.    Physical exam:  Awake alert orient x3, pleasant, no acute distress   Heart-regular rate rhythm, no edema   Lungs-Clear to auscultation bilaterally, respirations even unlabored   Abdomen-soft nontender normoactive bowel sounds   Extremities-moving all equally without any focal deficits     Goals of Care Treatment Preferences:  Code Status: Full Code      Consults:   Consults (From admission, onward)          Status Ordering Provider     Inpatient consult to Hospitalist  Once        Provider:  (Not yet assigned)    Acknowledged APARNA RODRIGUEZ            No new Assessment & Plan notes have been filed under this hospital service since the last note was generated.  Service: Hospital Medicine    Final Active Diagnoses:    Diagnosis Date Noted POA    PRINCIPAL PROBLEM:  Near syncope [R55] 02/04/2024 Yes    Chest  pain [R07.9] 02/04/2024 Yes      Problems Resolved During this Admission:       Discharged Condition: good    Disposition: Home or Self Care    Follow Up:   Follow-up Information       Stacie Sterling NP. Go on 2/8/2024.    Specialty: Family Medicine  Why: Appointment scheduled Thursday, 2/8/2024, at 10:15 to recheck your symptoms  Contact information:  [address]  [facility]  [place] [address]  [phone]               Brad Worley MD. Schedule an appointment as soon as possible for a visit in 1 month(s).    Specialty: Orthopedic Surgery  Contact information:  [address] [place] [address] [address] [place] [address]  [phone]                           Patient Instructions:      Diet Cardiac     Notify your health care provider if you experience any of the following:  temperature >100.4     Notify your health care provider if you experience any of the following:  persistent nausea and vomiting or diarrhea     Notify your health care provider if you experience any of the following:  severe uncontrolled pain     Notify your health care provider if you experience any of the following:  difficulty breathing or increased cough     Notify your health care provider if you experience any of the following:  persistent dizziness, light-headedness, or visual disturbances     Activity as tolerated       Significant Diagnostic Studies: Labs: CMP   Recent Labs   Lab 02/04/24  1505 02/05/24  0646    137   K 3.3* 3.8    105   CO2 22* 27   GLU 83 95   BUN 21* 19   CREATININE 0.9 0.9   CALCIUM 9.5 8.5*   PROT 6.9 6.1   ALBUMIN 4.5 3.8   BILITOT 0.5 0.3   ALKPHOS 43* 39*   AST 20 16   ALT 14 12   ANIONGAP 12 5*   , CBC   Recent Labs   Lab 02/04/24  1505 02/05/24  0647   WBC 7.36 6.31   HGB 13.5 12.7   HCT 39.8 38.3    217   , and All labs within the past 24 hours have been reviewed    Pending Diagnostic Studies:       None           Medications:  Reconciled Home Medications:      Medication List         CHANGE how you take these medications      clindamycin phosphate 1% 1 % gel  Commonly known as: CLINDAGEL  Apply ACNE BID  What changed:   how much to take  how to take this  when to take this     clobetasoL 0.05 % cream  Commonly known as: TEMOVATE  Aaa bid x 3 wks, tk 1 wk off FOR HAND ECZEMA/CONTACT DERMATITIS  What changed:   how much to take  how to take this            CONTINUE taking these medications      cyclobenzaprine 5 MG tablet  Commonly known as: FLEXERIL  Take 5 mg by mouth every evening.     hydroCHLOROthiazide 25 MG tablet  Commonly known as: HYDRODIURIL  Take 25 mg by mouth once daily.     meloxicam 15 MG tablet  Commonly known as: MOBIC  Take 15 mg by mouth once daily.     progesterone 200 MG capsule  Commonly known as: PROMETRIUM  Take 400 mg by mouth every evening.            STOP taking these medications      ketoconazole 2 % cream  Commonly known as: NIZORAL              Indwelling Lines/Drains at time of discharge:   Lines/Drains/Airways       None                   Time spent on the discharge of patient: 39 minutes         Trina Fragoso NP  Department of Hospital Medicine  [facility]

## 2024-02-05 NOTE — DISCHARGE INSTRUCTIONS
Rest and drink adequate fluids. Take medications as prescribed. Return to ED for any worsening symptoms or concerns. Follow up as directed.     Discharge Instructions, [facility]    Thank you for choosing [facility] for your medical care.     You were admitted to the hospital with Near syncope.     Please note your discharge instructions, including diet/activity restrictions, follow-up appointments, and medication changes.  If you have any questions about your medical issues, prescriptions, or any other questions, please feel free to contact the [facility] Medicine Dept at [phone] and we will help.    If you are previously with Home health, outpatient PT/OT or under a therapy program, you are cleared to return to those programs.    Please direct all long term medication refills and follow up to your primary care provider, Stacie Sterling NP. Thank you again for letting us take care of your health care needs.    Please note the following discharge instructions per your discharging physician-  Trina Fragoso NP

## 2024-02-05 NOTE — PLAN OF CARE
F/u scheduled 2/8/2024 at 10:15am.       02/05/24 0828   Post-Acute Status   Hospital Resources/Appts/Education Provided Appointments scheduled and added to AVS

## 2024-02-05 NOTE — PLAN OF CARE
Problem: Adult Inpatient Plan of Care  Goal: Plan of Care Review  Outcome: Ongoing, Progressing  Goal: Patient-Specific Goal (Individualized)  Outcome: Ongoing, Progressing  Goal: Absence of Hospital-Acquired Illness or Injury  Outcome: Ongoing, Progressing  Goal: Optimal Comfort and Wellbeing  Outcome: Ongoing, Progressing  Goal: Readiness for Transition of Care  Outcome: Ongoing, Progressing     Problem: Syncope  Goal: Absence of Syncopal Symptoms  Outcome: Ongoing, Progressing     Problem: Pain Acute  Goal: Acceptable Pain Control and Functional Ability  Outcome: Ongoing, Progressing

## 2024-02-05 NOTE — NURSING
Pt gave emergency room an xray on a disk and paper work from urgent care visit. Notified ED Charge Nurse that pt would like her urgent care discharge paperwork and disk back.

## 2024-02-08 ENCOUNTER — PATIENT OUTREACH (OUTPATIENT)
Dept: ADMINISTRATIVE | Facility: CLINIC | Age: 54
End: 2024-02-08
Payer: COMMERCIAL

## 2024-02-08 LAB
OHS QRS DURATION: 100 MS
OHS QTC CALCULATION: 448 MS

## 2024-02-08 NOTE — PROGRESS NOTES
C3 nurse attempted to contact Corinne Miller Wingerter for a TCC post hospital discharge follow up call. No answer. Left voicemail with callback information. The patient had a scheduled HOSFU appointment with Stacie Sterling NP on 02/08/24 @ 1015.

## 2024-02-09 NOTE — PROGRESS NOTES
C3 nurse spoke with Corinnedelta Ramirez's  Cristobal for a TCC post hospital discharge follow up call. The patient had a scheduled HOSFU appointment with Stacie Sterling on 02/08/24 @ 1015.

## 2024-05-17 ENCOUNTER — PATIENT MESSAGE (OUTPATIENT)
Dept: UROLOGY | Facility: CLINIC | Age: 54
End: 2024-05-17

## 2024-05-17 ENCOUNTER — OFFICE VISIT (OUTPATIENT)
Dept: UROLOGY | Facility: CLINIC | Age: 54
End: 2024-05-17
Payer: COMMERCIAL

## 2024-05-17 VITALS — HEIGHT: 66 IN | BODY MASS INDEX: 33.31 KG/M2 | WEIGHT: 207.25 LBS

## 2024-05-17 DIAGNOSIS — R30.0 DYSURIA: Primary | ICD-10-CM

## 2024-05-17 LAB
BILIRUBIN, UA POC OHS: NEGATIVE
BLOOD, UA POC OHS: NEGATIVE
CLARITY, UA POC OHS: CLEAR
COLOR, UA POC OHS: YELLOW
GLUCOSE, UA POC OHS: NEGATIVE
KETONES, UA POC OHS: NEGATIVE
LEUKOCYTES, UA POC OHS: NEGATIVE
NITRITE, UA POC OHS: NEGATIVE
PH, UA POC OHS: 7.5
PROTEIN, UA POC OHS: NEGATIVE
SPECIFIC GRAVITY, UA POC OHS: 1.02
UROBILINOGEN, UA POC OHS: 0.2

## 2024-05-17 PROCEDURE — 99214 OFFICE O/P EST MOD 30 MIN: CPT | Mod: S$GLB,,, | Performed by: NURSE PRACTITIONER

## 2024-05-17 PROCEDURE — 3008F BODY MASS INDEX DOCD: CPT | Mod: CPTII,S$GLB,, | Performed by: NURSE PRACTITIONER

## 2024-05-17 PROCEDURE — 81003 URINALYSIS AUTO W/O SCOPE: CPT | Mod: QW,S$GLB,, | Performed by: NURSE PRACTITIONER

## 2024-05-17 PROCEDURE — 87086 URINE CULTURE/COLONY COUNT: CPT | Performed by: NURSE PRACTITIONER

## 2024-05-17 PROCEDURE — 99999 PR PBB SHADOW E&M-EST. PATIENT-LVL III: CPT | Mod: PBBFAC,,, | Performed by: NURSE PRACTITIONER

## 2024-05-17 PROCEDURE — 1160F RVW MEDS BY RX/DR IN RCRD: CPT | Mod: CPTII,S$GLB,, | Performed by: NURSE PRACTITIONER

## 2024-05-17 PROCEDURE — 1159F MED LIST DOCD IN RCRD: CPT | Mod: CPTII,S$GLB,, | Performed by: NURSE PRACTITIONER

## 2024-05-17 NOTE — PROGRESS NOTES
"CHIEF COMPLAINT:    Mrs Ramirez is a 54 y.o. female presenting for UTI    PRESENTING ILLNESS:    Corinne Miller Wingerter is a 54 y.o. female who presents for UTI. Last clinic visit was 9/1/23.    5/17/24  Pt presents today for UTI.  Pt c/o "itching" on outside of urethra and mild dysuria. Using a cream from PCP for itching but unsure of name of cream. Using for past year.  Denies gross hematuria, flank pain, fever, chills, nausea or vomiting.  Using urinary tract vitamins/supplements from [employer] to help prevent UTIs  Utiva was too expensive   Pt is scheduled for carpal tunnel surgery next week  UA negative today    No UTI's since last visit.    2/4/24 UA negative    9/1/23 JEMMA: negative JEMMA    9/1/23  Pt presents today for recurrent UTI  Pt was diagnosed by PCP with UTI based on UA (on AZO). She received 2 courses of bactrim 5/24 and 8/3. She had culture completed 8/22/23 resulted E faecalis and prescribed macrobid. Bactrim did not cover organism. Pt reports she has been having UTI's since her visit with Dr. Nair but rarely has culture completed.   UTI symptoms include occasional dysuria and low back pain  Denies gross hematuria, flank pain, fever, chills, nausea or vomiting associated with UTI.  No UTI symptoms today in clinic  UA negative    No issues voiding as baseline. No UUI or FABRIZIO. Does not wear pads.    No hx of hysterectomy or bladder surgeries  Patient has hormone pellets placed by PCP. She is unsure if she would like to continue on them. She would like referral to GYN for post menopausal symptoms.    Pt unsure if UTI's correlate with intercourse    Pt has seen Dr Nair with Urogyn in the past (8/19/19).     Drinks ~30-48 oz water per day  + constipation, treated with diet    History of kidney stones: denies  Personal or family hx of  malignancy: denies  Smoking history: never smoked      Urine cultures:   Lab Results   Component Value Date    LABURIN No growth 09/27/2023       REVIEW OF " SYSTEMS:    Review of Systems    Constitutional: Negative for fever and chills.   Gastrointestinal: Negative for nausea, vomiting.  Genitourinary:  See above  Neurological: Negative for dizziness.   Psychiatric/Behavioral: Negative for confusion.     PATIENT HISTORY:    Past Medical History:   Diagnosis Date    Cochlear implant in place     Hearing impaired        Past Surgical History:   Procedure Laterality Date    IMPLANTATION OF COCHLEAR PROSTHESIS      PERCUTANEOUS TENOTOMY Right 09/29/2021    Procedure: TENOTOMY, PERCUTANEOUS HIP;  Surgeon: Oralia Finn DO;  Location: [facility] OR;  Service: Orthopedics;  Laterality: Right;  will bring own crutches/walker       Family History   Problem Relation Name Age of Onset    Skin cancer Mother         Social History     Socioeconomic History    Marital status:    Tobacco Use    Smoking status: Never    Smokeless tobacco: Never   Substance and Sexual Activity    Alcohol use: Yes    Drug use: Never    Sexual activity: Yes       Allergies:  Milk containing products (dairy)    Medications:    Current Outpatient Medications:     clindamycin phosphate 1% (CLINDAGEL) 1 % gel, Apply ACNE BID, Disp: 60 g, Rfl: 11    clobetasoL (TEMOVATE) 0.05 % cream, Aaa bid x 3 wks, tk 1 wk off FOR HAND ECZEMA/CONTACT DERMATITIS, Disp: 60 g, Rfl: 2    cyclobenzaprine (FLEXERIL) 5 MG tablet, Take 5 mg by mouth every evening., Disp: , Rfl:     hydroCHLOROthiazide (HYDRODIURIL) 25 MG tablet, Take 25 mg by mouth once daily., Disp: , Rfl:     meloxicam (MOBIC) 15 MG tablet, Take 15 mg by mouth once daily., Disp: , Rfl:     progesterone (PROMETRIUM) 200 MG capsule, Take 400 mg by mouth every evening., Disp: , Rfl:     PHYSICAL EXAMINATION:    Constitutional: She is oriented to person, place, and time. She appears well-developed and well-nourished.  She is in no apparent distress.    Abdominal:  She exhibits no distension.  There is no CVA tenderness.     Neurological: She is alert and  oriented to person, place, and time.     Psych: Cooperative with normal affect.    Physical Exam    LABS:      Lab Results   Component Value Date    CREATININE 0.9 02/05/2024       IMPRESSION:    Encounter Diagnoses   Name Primary?    Dysuria Yes         PLAN:  -Urine sent for culture since scheduled for surgery next week  Will treat based on results since UA negative in clinic  If negative for UTI, f/u with GYN or PCP to r/o yeast/vaginal infection    -RTC as needed    I encouraged her or any of her family members to call or email me with questions and/or concerns.      30 minutes of total time spent on the encounter, which includes face to face time and non-face to face time preparing to see the patient (eg, review of tests), Obtaining and/or reviewing separately obtained history, Documenting clinical information in the electronic or other health record, Independently interpreting results (not separately reported) and communicating results to the patient/family/caregiver, or Care coordination (not separately reported).

## 2024-05-17 NOTE — PATIENT INSTRUCTIONS
Call clinic Monday if you do not hear from staff regarding culture results.  Will treat based on culture results

## 2024-05-18 LAB
BACTERIA UR CULT: NORMAL
BACTERIA UR CULT: NORMAL

## 2024-10-25 ENCOUNTER — OFFICE VISIT (OUTPATIENT)
Dept: UROLOGY | Facility: CLINIC | Age: 54
End: 2024-10-25
Payer: COMMERCIAL

## 2024-10-25 DIAGNOSIS — N89.8 VAGINAL ITCHING: ICD-10-CM

## 2024-10-25 DIAGNOSIS — N39.0 RECURRENT UTI: ICD-10-CM

## 2024-10-25 DIAGNOSIS — R30.0 DYSURIA: Primary | ICD-10-CM

## 2024-10-25 LAB
BILIRUBIN, UA POC OHS: NEGATIVE
BLOOD, UA POC OHS: NEGATIVE
CLARITY, UA POC OHS: CLEAR
COLOR, UA POC OHS: YELLOW
GLUCOSE, UA POC OHS: NEGATIVE
KETONES, UA POC OHS: NEGATIVE
LEUKOCYTES, UA POC OHS: NEGATIVE
NITRITE, UA POC OHS: NEGATIVE
PH, UA POC OHS: 5.5
PROTEIN, UA POC OHS: NEGATIVE
SPECIFIC GRAVITY, UA POC OHS: 1.01
UROBILINOGEN, UA POC OHS: 0.2

## 2024-10-25 PROCEDURE — 1160F RVW MEDS BY RX/DR IN RCRD: CPT | Mod: CPTII,S$GLB,, | Performed by: NURSE PRACTITIONER

## 2024-10-25 PROCEDURE — 87086 URINE CULTURE/COLONY COUNT: CPT | Performed by: NURSE PRACTITIONER

## 2024-10-25 PROCEDURE — 81003 URINALYSIS AUTO W/O SCOPE: CPT | Mod: QW,S$GLB,, | Performed by: NURSE PRACTITIONER

## 2024-10-25 PROCEDURE — 1159F MED LIST DOCD IN RCRD: CPT | Mod: CPTII,S$GLB,, | Performed by: NURSE PRACTITIONER

## 2024-10-25 PROCEDURE — 99214 OFFICE O/P EST MOD 30 MIN: CPT | Mod: S$GLB,,, | Performed by: NURSE PRACTITIONER

## 2024-10-25 PROCEDURE — 99999 PR PBB SHADOW E&M-EST. PATIENT-LVL III: CPT | Mod: PBBFAC,,, | Performed by: NURSE PRACTITIONER

## 2024-10-25 RX ORDER — FLUCONAZOLE 150 MG/1
150 TABLET ORAL DAILY
Qty: 5 TABLET | Refills: 0 | Status: SHIPPED | OUTPATIENT
Start: 2024-10-25 | End: 2024-10-30

## 2024-10-26 LAB
BACTERIA UR CULT: NORMAL
BACTERIA UR CULT: NORMAL

## 2025-01-14 ENCOUNTER — OFFICE VISIT (OUTPATIENT)
Dept: OBSTETRICS AND GYNECOLOGY | Facility: CLINIC | Age: 55
End: 2025-01-14
Payer: COMMERCIAL

## 2025-01-14 VITALS
WEIGHT: 214.31 LBS | BODY MASS INDEX: 34.44 KG/M2 | HEIGHT: 66 IN | DIASTOLIC BLOOD PRESSURE: 80 MMHG | SYSTOLIC BLOOD PRESSURE: 142 MMHG

## 2025-01-14 DIAGNOSIS — Z12.4 SCREENING FOR MALIGNANT NEOPLASM OF CERVIX: ICD-10-CM

## 2025-01-14 DIAGNOSIS — N89.8 VAGINAL DISCHARGE: ICD-10-CM

## 2025-01-14 DIAGNOSIS — N95.0 PMB (POSTMENOPAUSAL BLEEDING): ICD-10-CM

## 2025-01-14 DIAGNOSIS — Z78.0 POSTMENOPAUSAL STATE: ICD-10-CM

## 2025-01-14 DIAGNOSIS — Z01.419 WELL WOMAN EXAM WITH ROUTINE GYNECOLOGICAL EXAM: Primary | ICD-10-CM

## 2025-01-14 DIAGNOSIS — Z12.31 ENCOUNTER FOR SCREENING MAMMOGRAM FOR MALIGNANT NEOPLASM OF BREAST: ICD-10-CM

## 2025-01-14 PROCEDURE — 81515 NFCT DS BV&VAGINITIS DNA ALG: CPT | Performed by: OBSTETRICS & GYNECOLOGY

## 2025-01-14 PROCEDURE — 99213 OFFICE O/P EST LOW 20 MIN: CPT | Mod: PBBFAC,PO | Performed by: OBSTETRICS & GYNECOLOGY

## 2025-01-14 PROCEDURE — 87624 HPV HI-RISK TYP POOLED RSLT: CPT | Performed by: OBSTETRICS & GYNECOLOGY

## 2025-01-14 PROCEDURE — 99999 PR PBB SHADOW E&M-EST. PATIENT-LVL III: CPT | Mod: PBBFAC,,, | Performed by: OBSTETRICS & GYNECOLOGY

## 2025-01-14 PROCEDURE — 88175 CYTOPATH C/V AUTO FLUID REDO: CPT | Performed by: OBSTETRICS & GYNECOLOGY

## 2025-01-14 PROCEDURE — 99396 PREV VISIT EST AGE 40-64: CPT | Mod: S$PBB,,, | Performed by: OBSTETRICS & GYNECOLOGY

## 2025-01-14 NOTE — PROGRESS NOTES
[facility] Note      SUBJECTIVE     Chief Complaint   Patient presents with    Annual Exam       History and Physical:  Patient's last menstrual period was 2020.    HRT: None    Date: 2025    Corinne Miller Wingerter is a 55 y.o.  who presents today for her routine annual GYN exam.     Gynecologic issues:  The patient reports a vaginal discharge with itching present for approximately four days which has not responded to over-the-counter medication.    She denies any pelvic pain  She reports issues with postmenopausal bleeding to some extent daily over the last six months.    Pap smear history:  No history of abnormal Pap smears.  Last Pap smear: 2023    Mammogram: Up-to-date   Colon cancer screening:  Up-to-date   Personal or family history of bleeding or blood clotting disorders:  Negative     Family history:  Breast cancer:  Negative  Colon cancer:  Negative   Gyn related cancer:  Negative     Allergies:   Review of patient's allergies indicates:   Allergen Reactions    Milk containing products (dairy)        Past Medical History:   Diagnosis Date    Cochlear implant in place     Hearing impaired        Past Surgical History:   Procedure Laterality Date    IMPLANTATION OF COCHLEAR PROSTHESIS      PERCUTANEOUS TENOTOMY Right 2021    Procedure: TENOTOMY, PERCUTANEOUS HIP;  Surgeon: Oralia Finn DO;  [facility]: [facility];  Service: Orthopedics;  Laterality: Right;  will bring own crutches/walker       MEDS:   Current Outpatient Medications:     clindamycin phosphate 1% (CLINDAGEL) 1 % gel, Apply ACNE BID, Disp: 60 g, Rfl: 11    cyclobenzaprine (FLEXERIL) 5 MG tablet, Take 5 mg by mouth every evening., Disp: , Rfl:     hydroCHLOROthiazide (HYDRODIURIL) 25 MG tablet, Take 25 mg by mouth once daily., Disp: , Rfl:     meloxicam (MOBIC) 15 MG tablet, Take 15 mg by mouth once daily., Disp: , Rfl:     progesterone (PROMETRIUM) 200 MG capsule, Take 400 mg  "by mouth every evening., Disp: , Rfl:     clobetasoL (TEMOVATE) 0.05 % cream, Aaa bid x 3 wks, tk 1 wk off FOR HAND ECZEMA/CONTACT DERMATITIS (Patient not taking: Reported on 2025), Disp: 60 g, Rfl: 2     OB History          3    Para   3    Term   3            AB        Living   3         SAB        IAB        Ectopic        Multiple        Live Births   3           Obstetric Comments   Vaginal delivery x 3               Age of Menarche:16  Age at first pregnancy:29   Age at first live birth:30  Number of months breastfeeding:    Age at Menopause:47   Comments:       Social History     Tobacco Use    Smoking status: Never    Smokeless tobacco: Never   Substance Use Topics    Alcohol use: Yes    Drug use: Never       Family History   Problem Relation Name Age of Onset    Skin cancer Mother         Past medical and surgical history reviewed.   I have reviewed the patient's medical history in detail and updated the computerized patient record.    Review of Systems (at today's evaluation)  Review of Systems   Constitutional:  Negative for fever and unexpected weight change.   Respiratory:  Negative for cough and shortness of breath.    Cardiovascular:  Negative for chest pain.   Gastrointestinal:  Negative for constipation, diarrhea and nausea.   Genitourinary:  Negative for dysuria and frequency.          GYN AS PER HPI   Musculoskeletal: Negative.    Skin: Negative.    Neurological: Negative.         Physical Exam:   BP (!) 142/80 (BP Location: Right arm, Patient Position: Sitting)   Ht 5' 6" (1.676 m)   Wt 97.2 kg (214 lb 4.6 oz)   LMP 2020   BMI 34.59 kg/m²     Physical Exam:   Constitutional: She appears well-developed and well-nourished.    HENT:   Head: Normocephalic.     Neck: No thyroid mass present.    Cardiovascular:  Normal rate.             Pulmonary/Chest: Effort normal. Right breast exhibits no mass, no nipple discharge and no skin change. Left breast exhibits no mass, no " nipple discharge and no skin change.        Abdominal: Soft. There is no abdominal tenderness.     Genitourinary:    Inguinal canal, vagina, uterus, right adnexa and left adnexa normal.      Pelvic exam was performed with patient supine.   The external female genitalia was normal.   No external genitalia lesions identified,Cervix is normal. Right adnexum displays no mass and no tenderness. Left adnexum displays no mass and no tenderness. No tenderness or bleeding in the vagina. Uterus is not tender. Normal urethral meatus.Urethra findings: no tendernessBladder findings: no bladder tenderness   Genitourinary Comments: Chaperone (female medical assistant) present throughout physical exam.             Musculoskeletal:      Right lower leg: No edema.      Left lower leg: No edema.      Lymphadenopathy:     She has no cervical adenopathy. No inguinal adenopathy noted on the right or left side.    Neurological: She is alert.   No gross defects noted    Skin: Skin is warm and dry.    Psychiatric: Mood normal.        Assessment:        1. Well woman exam with routine gynecological exam    2. Screening for malignant neoplasm of cervix    3. Vaginal discharge    4. Postmenopausal state    5. PMB (postmenopausal bleeding)    6. Encounter for screening mammogram for malignant neoplasm of breast         Plan:      Well woman exam with routine gynecological exam    Screening for malignant neoplasm of cervix  -     HPV High Risk Genotypes, PCR  -     Liquid-Based Pap Smear, Screening    Vaginal discharge  -     Vaginosis Screen by DNA Probe    Postmenopausal state  -     US Pelvis Comp with Transvag NON-OB (xpd; Future; Expected date: 01/14/2025    PMB (postmenopausal bleeding)  -     Progesterone; Future; Expected date: 01/14/2025  -     Luteinizing Hormone; Future; Expected date: 01/14/2025  -     Follicle Stimulating Hormone; Future; Expected date: 01/14/2025  -     Testosterone Panel; Future; Expected date: 01/14/2025  -      Estradiol; Future; Expected date: 01/14/2025  -     Mammo Digital Screening Bilat w/ Ben; Future; Expected date: 01/14/2025    Encounter for screening mammogram for malignant neoplasm of breast  -     Cancel: Mammo Digital Screening Bilat w/ Ben; Future; Expected date: 01/14/2025  -     Mammo Digital Screening Bilat w/ Ben; Future; Expected date: 01/14/2025       Follow up for ASAP after recommended testing obtained, F/U on today's evaluation.     The above was reviewed and discussed with the patient.    Annual exam and screening issues based on the patient's age, medical history and family history were reviewed / discussed.  Routine health maintenance issues were reviewed and discussed.      In regards to the patient's vaginal itching and discharge vaginitis panel was obtained   We discussed the patient's postmenopausal bleeding and hormonal lab work to confirm menopausal status as well as pelvic ultrasound has been ordered   We will base further evaluation results of the above testing    The patient's questions were answered, and she is in agreement with the current plan.     Hemal Marcano MD  Department OBN  [facility]

## 2025-01-16 LAB
BACTERIAL VAGINOSIS DNA: NOT DETECTED
CANDIDA GLABRATA/KRUSEI: NOT DETECTED
CANDIDA RRNA VAG QL PROBE: NOT DETECTED
TRICHOMONAS VAGINALIS: NOT DETECTED

## 2025-01-17 ENCOUNTER — LAB VISIT (OUTPATIENT)
Dept: LAB | Facility: HOSPITAL | Age: 55
End: 2025-01-17
Attending: OBSTETRICS & GYNECOLOGY
Payer: COMMERCIAL

## 2025-01-17 DIAGNOSIS — N95.0 PMB (POSTMENOPAUSAL BLEEDING): ICD-10-CM

## 2025-01-17 LAB
ESTRADIOL SERPL-MCNC: 51 PG/ML
FSH SERPL-ACNC: 11.1 MIU/ML
LH SERPL-ACNC: 6.1 MIU/ML
PROGEST SERPL-MCNC: 11.4 NG/ML

## 2025-01-17 PROCEDURE — 83001 ASSAY OF GONADOTROPIN (FSH): CPT | Performed by: OBSTETRICS & GYNECOLOGY

## 2025-01-17 PROCEDURE — 83002 ASSAY OF GONADOTROPIN (LH): CPT | Performed by: OBSTETRICS & GYNECOLOGY

## 2025-01-17 PROCEDURE — 84144 ASSAY OF PROGESTERONE: CPT | Performed by: OBSTETRICS & GYNECOLOGY

## 2025-01-17 PROCEDURE — 82040 ASSAY OF SERUM ALBUMIN: CPT | Performed by: OBSTETRICS & GYNECOLOGY

## 2025-01-17 PROCEDURE — 82670 ASSAY OF TOTAL ESTRADIOL: CPT | Performed by: OBSTETRICS & GYNECOLOGY

## 2025-01-23 ENCOUNTER — PATIENT MESSAGE (OUTPATIENT)
Dept: OBSTETRICS AND GYNECOLOGY | Facility: CLINIC | Age: 55
End: 2025-01-23
Payer: COMMERCIAL

## 2025-01-23 LAB
ALBUMIN SERPL-MCNC: 4.6 G/DL (ref 3.6–5.1)
SHBG SERPL-SCNC: 39 NMOL/L (ref 17–124)
TESTOST FREE SERPL-MCNC: 9.3 PG/ML (ref 0.2–5)
TESTOST SERPL-MCNC: 90 NG/DL (ref 2–45)
TESTOSTERONE.FREE+WB SERPL-MCNC: 19.6 NG/DL (ref 0.5–8.5)

## 2025-01-24 ENCOUNTER — HOSPITAL ENCOUNTER (OUTPATIENT)
Dept: RADIOLOGY | Facility: HOSPITAL | Age: 55
Discharge: HOME | End: 2025-01-24
Attending: OBSTETRICS & GYNECOLOGY
Payer: COMMERCIAL

## 2025-01-24 DIAGNOSIS — Z78.0 POSTMENOPAUSAL STATE: ICD-10-CM

## 2025-01-24 PROCEDURE — 76856 US EXAM PELVIC COMPLETE: CPT | Mod: 26,,, | Performed by: RADIOLOGY

## 2025-01-24 PROCEDURE — 76830 TRANSVAGINAL US NON-OB: CPT | Mod: 26,,, | Performed by: RADIOLOGY

## 2025-01-24 PROCEDURE — 76830 TRANSVAGINAL US NON-OB: CPT | Mod: TC,PO

## 2025-01-25 LAB
FINAL PATHOLOGIC DIAGNOSIS: NORMAL
Lab: NORMAL

## 2025-01-30 ENCOUNTER — OFFICE VISIT (OUTPATIENT)
Dept: OBSTETRICS AND GYNECOLOGY | Facility: CLINIC | Age: 55
End: 2025-01-30
Payer: OTHER GOVERNMENT

## 2025-01-30 VITALS
BODY MASS INDEX: 34.44 KG/M2 | DIASTOLIC BLOOD PRESSURE: 84 MMHG | WEIGHT: 214.31 LBS | HEIGHT: 66 IN | SYSTOLIC BLOOD PRESSURE: 142 MMHG

## 2025-01-30 DIAGNOSIS — N95.0 PMB (POSTMENOPAUSAL BLEEDING): Primary | ICD-10-CM

## 2025-01-30 DIAGNOSIS — Z78.0 POSTMENOPAUSAL STATE: ICD-10-CM

## 2025-01-30 DIAGNOSIS — R10.2 PELVIC PAIN: ICD-10-CM

## 2025-01-30 PROCEDURE — 99999 PR PBB SHADOW E&M-EST. PATIENT-LVL III: CPT | Mod: PBBFAC,,, | Performed by: OBSTETRICS & GYNECOLOGY

## 2025-01-30 PROCEDURE — 58100 BIOPSY OF UTERUS LINING: CPT | Mod: PBBFAC,PO | Performed by: OBSTETRICS & GYNECOLOGY

## 2025-01-30 PROCEDURE — 99213 OFFICE O/P EST LOW 20 MIN: CPT | Mod: PBBFAC,PO | Performed by: OBSTETRICS & GYNECOLOGY

## 2025-01-30 NOTE — PROGRESS NOTES
[facility] Note      Pertinent Med & GYN Problem List    Cochlear implant    SUBJECTIVE     Chief Complaint   Patient presents with    Follow-up     Lab and u/s follow up       History and Physical:  Patient's last menstrual period was 2020.    HRT:  Prometrium 200 mg p.o. q.h.s. and pellet therapy last placed 2024    Date: 2025    Corinne Miller Wingerter is a 55 y.o.  who presents today for her routine annual GYN exam.     Gynecologic issues:  The patient reports a vaginal discharge with itching present for approximately four days which has not responded to over-the-counter medication.    She denies any pelvic pain  She reports issues with postmenopausal bleeding to some extent daily over the last six months.    Pap smear history:  No history of abnormal Pap smears.  Last Pap smear: 2023    Mammogram: Up-to-date   Colon cancer screening:  Up-to-date   Personal or family history of bleeding or blood clotting disorders:  Negative     Family history:  Breast cancer:  Negative  Colon cancer:  Negative   Gyn related cancer:  Negative     Date: 2025    The patient presents today for follow-up.  She was last seen as above, at which time lab work and ultrasound were obtained in light of the patient's bleeding..    She presents to discuss the results of her laboratory evaluation / radiologic evaluation as well as further potential evaluation and treatment options.     On further discussion today the patient now reports being on hormonal pellets placed in 2024 as well as oral micronized progesterone 200 mg two pills p.o. Q q.h.s..  She continues episodes of bleeding which tends to come and go as well as occasional episodes of right lower quadrant pain.    Allergies:   Review of patient's allergies indicates:   Allergen Reactions    Milk containing products (dairy)        Past Medical History:   Diagnosis Date    Cochlear implant in  place     Hearing impaired        Past Surgical History:   Procedure Laterality Date    IMPLANTATION OF COCHLEAR PROSTHESIS      PERCUTANEOUS TENOTOMY Right 2021    Procedure: TENOTOMY, PERCUTANEOUS HIP;  Surgeon: Oralia Finn DO;  Location: [facility] OR;  Service: Orthopedics;  Laterality: Right;  will bring own crutches/walker       MEDS:   Current Outpatient Medications:     clindamycin phosphate 1% (CLINDAGEL) 1 % gel, Apply ACNE BID, Disp: 60 g, Rfl: 11    cyclobenzaprine (FLEXERIL) 5 MG tablet, Take 5 mg by mouth every evening., Disp: , Rfl:     hydroCHLOROthiazide (HYDRODIURIL) 25 MG tablet, Take 25 mg by mouth once daily., Disp: , Rfl:     meloxicam (MOBIC) 15 MG tablet, Take 15 mg by mouth once daily., Disp: , Rfl:     progesterone (PROMETRIUM) 200 MG capsule, Take 400 mg by mouth every evening., Disp: , Rfl:     clobetasoL (TEMOVATE) 0.05 % cream, Aaa bid x 3 wks, tk 1 wk off FOR HAND ECZEMA/CONTACT DERMATITIS (Patient not taking: Reported on 2025), Disp: 60 g, Rfl: 2     OB History          3    Para   3    Term   3            AB        Living   3         SAB        IAB        Ectopic        Multiple        Live Births   3           Obstetric Comments   Vaginal delivery x 3               Age of Menarche:16  Age at first pregnancy:29   Age at first live birth:30  Number of months breastfeeding:    Age at Menopause:47   Comments:       Social History     Tobacco Use    Smoking status: Never    Smokeless tobacco: Never   Substance Use Topics    Alcohol use: Yes    Drug use: Never       Family History   Problem Relation Name Age of Onset    Skin cancer Mother         Past medical and surgical history reviewed.   I have reviewed the patient's medical history in detail and updated the computerized patient record.    Review of Systems (at today's evaluation)  Review of Systems   Constitutional:  Negative for fever and unexpected weight change.   Respiratory:  Negative for cough and  "shortness of breath.    Cardiovascular:  Negative for chest pain.   Gastrointestinal:  Negative for constipation, diarrhea and nausea.   Genitourinary:  Negative for dysuria and frequency.          GYN AS PER HPI   Musculoskeletal: Negative.    Skin: Negative.    Neurological: Negative.         Physical Exam:   BP (!) 142/84 (BP Location: Right arm, Patient Position: Sitting)   Ht 5' 6" (1.676 m)   Wt 97.2 kg (214 lb 4.6 oz)   LMP 08/29/2020   BMI 34.59 kg/m²     Physical Exam:   Constitutional: She appears well-developed and well-nourished. No distress.    HENT:   Head: Normocephalic.     Neck: No thyroid mass present.    Cardiovascular:  Normal rate.             Pulmonary/Chest: Effort normal. No respiratory distress.        Abdominal: Soft. There is no abdominal tenderness.     Genitourinary:    Inguinal canal, vagina, uterus, right adnexa and left adnexa normal.      Pelvic exam was performed with patient supine.   The external female genitalia was normal.   No external genitalia lesions identified,Cervix is normal. Right adnexum displays no mass and no tenderness. Left adnexum displays no mass and no tenderness. No tenderness or bleeding in the vagina. Uterus is not tender. Normal urethral meatus.Urethra findings: no tendernessBladder findings: no bladder tenderness   Genitourinary Comments: A chaperone (female medical assistant) was present throughout the pelvic exam.             Musculoskeletal: Normal range of motion.      Right lower leg: No edema.      Left lower leg: No edema.      Lymphadenopathy: No inguinal adenopathy noted on the right or left side.    Neurological: She is alert.    Skin: Skin is warm and dry.    Psychiatric: She has a normal mood and affect. Mood normal.      Recent Laboratory Results:    Pap smear from 1/14/2025 noted no cervical cell abnormalities and was HPV negative     Results    Collected Updated Procedure    01/17/2025 1241 01/17/2025 2223 Estradiol [[id number]]   Blood    " Component Value Units   Estradiol 51  pg/mL          01/17/2025 1241 01/23/2025 1538 Testosterone Panel [[id number]]   (Abnormal)   Blood    Component Value Units   Testosterone 90 High   ng/dL   Testosterone, Free 9.3 High   pg/mL   Testosterone, Bioavailable 19.6 High   ng/dL   Sex Hormone Binding Globulin 39  nmol/L   Albumin 4.6  g/dL          01/17/2025 1241 01/17/2025 2223 Follicle Stimulating Hormone [[id number]]   Blood    Component Value Units   Follicle Stimulating Hormone 11.10  mIU/mL          01/17/2025 1241 01/17/2025 2223 Luteinizing Hormone [[id number]]   Blood    Component Value Units   LH 6.1  mIU/mL          01/17/2025 1241 01/17/2025 2224 Progesterone [[id number]]   Blood    Component Value Units   Progesterone 11.4  ng/mL          01/14/2025 1619 01/16/2025 1821 Vaginosis Screen by DNA Probe [[id number]]    Genital    Component Value   Bacterial vaginosis DNA Not Detected   Candida sp Not Detected    Candida glabrata/krusei Not Detected   Trichomonas vaginalis Not Detected              Recent Radiology Results:    1/24/2025 Routine     Narrative & Impression  EXAMINATION:  US PELVIS COMP WITH TRANSVAG NON-OB (XPD)     CLINICAL HISTORY:  Asymptomatic menopausal state     FINDINGS:  Uterus:     Size: 8 x 4 x 6 cm     Masses: None     Endometrium: Normal in this post menopausal patient, measuring 5 mm.     Right ovary:     Right ovary not visualized with bowel gas shadowing in the adnexa     Left ovary:     Left ovary not visualized with bowel gas shadowing in the adnexa     Free Fluid:     None.     Impression:     Unremarkable uterus and endometrium.  Nonvisualized ovaries.    Pre-Procedure Time Out  Patient identification confirmed.  The planned procedure and procedure site were discussed.  The pros, cons, risks, benefits, alternatives, and indications of the office procedure were discussed in detail with the patient.  We discussed the possibility of allergic reactions, bleeding, infection, and  other issues unique to this procedure were discussed.    All participating parties are in agreed with the current procedure plan.  Verbal consent from the patient was obtained.      ENDOMETRIAL BIOPSY NOTE:  Date: 1/30/2025     The pros cons risks benefits alternatives and indications of office endometrial biopsy were reviewed discussed with the patient.  The patient's questions were answered and verbal consent was obtained.    With the patient in the dorsal lithotomy position, a speculum was placed into the vaginal vault and the cervix was cleaned with betadine.  The anterior lip of the cervix was grasped with a single-tooth tenaculum.  The endometrial pipelle was passed (x 2) to a depth of 8 cm without difficulty with moderate return of tissue.    Tolerated well, specimen sent to pathology.    Complications:  None.     Hemal Marcano MD  Department OBGYN  [facility]    Assessment:        1. PMB (postmenopausal bleeding)    2. Postmenopausal state       Plan:      PMB (postmenopausal bleeding)  -     Specimen to Pathology, Ob/Gyn    Postmenopausal state      Follow up in about 2 weeks (around 2/13/2025) for F/U on today's evaluation, as needed / for any GYN related issues.     The above was reviewed and discussed with the patient.    We discussed her overall normal findings on lab work and ultrasound, but the fact that she is currently on an unknown amount of estrogen that it is unclear as to her actual hormonal status.    We discussed the fact that her elevated testosterone is more than likely secondary to the pellet therapy     We discussed concerns for potential underlying issues in light of her continued bleeding despite significant amount of micronized progesterone.    Options reviewed and the patient underwent an endometrial biopsy today without difficulty.    We will base further evaluation treatment results of the endometrial biopsy.    The patient's questions were answered, and she is in agreement with  the current plan.     Hemal Marcano MD  Department OBGYN  [facility]

## 2025-02-17 ENCOUNTER — OFFICE VISIT (OUTPATIENT)
Dept: OBSTETRICS AND GYNECOLOGY | Facility: CLINIC | Age: 55
End: 2025-02-17
Payer: COMMERCIAL

## 2025-02-17 VITALS
SYSTOLIC BLOOD PRESSURE: 130 MMHG | WEIGHT: 214.31 LBS | HEIGHT: 66 IN | DIASTOLIC BLOOD PRESSURE: 64 MMHG | BODY MASS INDEX: 34.44 KG/M2

## 2025-02-17 DIAGNOSIS — N95.0 PMB (POSTMENOPAUSAL BLEEDING): Primary | ICD-10-CM

## 2025-02-17 DIAGNOSIS — N95.1 MENOPAUSAL SYMPTOMS: ICD-10-CM

## 2025-02-17 PROCEDURE — 3075F SYST BP GE 130 - 139MM HG: CPT | Mod: CPTII,S$GLB,, | Performed by: OBSTETRICS & GYNECOLOGY

## 2025-02-17 PROCEDURE — 3008F BODY MASS INDEX DOCD: CPT | Mod: CPTII,S$GLB,, | Performed by: OBSTETRICS & GYNECOLOGY

## 2025-02-17 PROCEDURE — 99999 PR PBB SHADOW E&M-EST. PATIENT-LVL III: CPT | Mod: PBBFAC,,, | Performed by: OBSTETRICS & GYNECOLOGY

## 2025-02-17 PROCEDURE — 1159F MED LIST DOCD IN RCRD: CPT | Mod: CPTII,S$GLB,, | Performed by: OBSTETRICS & GYNECOLOGY

## 2025-02-17 PROCEDURE — 99214 OFFICE O/P EST MOD 30 MIN: CPT | Mod: S$GLB,,, | Performed by: OBSTETRICS & GYNECOLOGY

## 2025-02-17 PROCEDURE — 3078F DIAST BP <80 MM HG: CPT | Mod: CPTII,S$GLB,, | Performed by: OBSTETRICS & GYNECOLOGY

## 2025-02-17 RX ORDER — MEDROXYPROGESTERONE ACETATE 10 MG/1
10 TABLET ORAL DAILY
Qty: 10 TABLET | Refills: 0 | Status: SHIPPED | OUTPATIENT
Start: 2025-02-17 | End: 2025-02-27

## 2025-02-17 RX ORDER — ESTRADIOL 1 MG/1
1 TABLET ORAL DAILY
Qty: 90 TABLET | Refills: 3 | Status: SHIPPED | OUTPATIENT
Start: 2025-02-17 | End: 2026-02-17

## 2025-02-17 NOTE — PROGRESS NOTES
[facility] Note      Pertinent Med & GYN Problem List    Cochlear implant    SUBJECTIVE     Chief Complaint   Patient presents with    Follow-up     EMB follow up       History and Physical:  Patient's last menstrual period was 2020.    HRT:  Prometrium 200 mg p.o. q.h.s. and pellet therapy last placed 2024    Date: 2025    Corinne Miller Wingerter is a 55 y.o.  who presents today for her routine annual GYN exam.     Gynecologic issues:  The patient reports a vaginal discharge with itching present for approximately four days which has not responded to over-the-counter medication.    She denies any pelvic pain  She reports issues with postmenopausal bleeding to some extent daily over the last six months.    Pap smear history:  No history of abnormal Pap smears.  Last Pap smear: 2023    Mammogram: Up-to-date   Colon cancer screening:  Up-to-date   Personal or family history of bleeding or blood clotting disorders:  Negative     Family history:  Breast cancer:  Negative  Colon cancer:  Negative   Gyn related cancer:  Negative     Date: 2025    The patient presents today for follow-up.  She was last seen as above, at which time lab work and ultrasound were obtained in light of the patient's bleeding..    She presents to discuss the results of her laboratory evaluation / radiologic evaluation as well as further potential evaluation and treatment options.     On further discussion today the patient now reports being on hormonal pellets placed in 2024 as well as oral micronized progesterone 200 mg two pills p.o. Q q.h.s..  She continues episodes of bleeding which tends to come and go as well as occasional episodes of right lower quadrant pain.    Date: 2025    The patient presents today for follow-up.  She was last seen as above, at which time in light of her abnormal bleeding an endometrial biopsy was performed.    She presents  to discuss the results of her laboratory evaluation as well as further potential evaluation and treatment options.    Final Pathologic Diagnosis ENDOMETRIUM, BIOPSY:  - Proliferative endometrium.  - No hyperplasia, atypia, or malignancy.          Allergies:   Review of patient's allergies indicates:   Allergen Reactions    Milk containing products (dairy)        Past Medical History:   Diagnosis Date    Cochlear implant in place     Hearing impaired        Past Surgical History:   Procedure Laterality Date    IMPLANTATION OF COCHLEAR PROSTHESIS      PERCUTANEOUS TENOTOMY Right 2021    Procedure: TENOTOMY, PERCUTANEOUS HIP;  Surgeon: Oralia Finn DO;  Location: [facility] OR;  Service: Orthopedics;  Laterality: Right;  will bring own crutches/walker       MEDS:   Current Outpatient Medications:     clindamycin phosphate 1% (CLINDAGEL) 1 % gel, Apply ACNE BID, Disp: 60 g, Rfl: 11    cyclobenzaprine (FLEXERIL) 5 MG tablet, Take 5 mg by mouth every evening., Disp: , Rfl:     hydroCHLOROthiazide (HYDRODIURIL) 25 MG tablet, Take 25 mg by mouth once daily., Disp: , Rfl:     meloxicam (MOBIC) 15 MG tablet, Take 15 mg by mouth once daily., Disp: , Rfl:     progesterone (PROMETRIUM) 200 MG capsule, Take 400 mg by mouth every evening., Disp: , Rfl:     clobetasoL (TEMOVATE) 0.05 % cream, Aaa bid x 3 wks, tk 1 wk off FOR HAND ECZEMA/CONTACT DERMATITIS (Patient not taking: Reported on 2025), Disp: 60 g, Rfl: 2    estradioL (ESTRACE) 1 MG tablet, Take 1 tablet (1 mg total) by mouth once daily., Disp: 90 tablet, Rfl: 3    medroxyPROGESTERone (PROVERA) 10 MG tablet, Take 1 tablet (10 mg total) by mouth once daily. for 10 days, Disp: 10 tablet, Rfl: 0     OB History          3    Para   3    Term   3            AB        Living   3         SAB        IAB        Ectopic        Multiple        Live Births   3           Obstetric Comments   Vaginal delivery x 3               Age of Menarche:16  Age at first  "pregnancy:29   Age at first live birth:30  Number of months breastfeeding:    Age at Menopause:47   Comments:       Social History     Tobacco Use    Smoking status: Never    Smokeless tobacco: Never   Substance Use Topics    Alcohol use: Yes    Drug use: Never       Family History   Problem Relation Name Age of Onset    Skin cancer Mother         Past medical and surgical history reviewed.   I have reviewed the patient's medical history in detail and updated the computerized patient record.    Review of Systems (at today's evaluation)  Review of Systems   Constitutional:  Negative for fever and unexpected weight change.   Respiratory:  Negative for cough and shortness of breath.    Cardiovascular:  Negative for chest pain.   Gastrointestinal:  Negative for constipation, diarrhea and nausea.   Genitourinary:  Negative for dysuria and frequency.          GYN AS PER HPI   Musculoskeletal: Negative.    Skin: Negative.    Neurological: Negative.         Physical Exam:   /64 (BP Location: Left arm, Patient Position: Sitting)   Ht 5' 6" (1.676 m)   Wt 97.2 kg (214 lb 4.6 oz)   LMP 08/29/2020   BMI 34.59 kg/m²     Physical Exam:   Constitutional: She appears well-developed and well-nourished. No distress.    HENT:   Head: Normocephalic.     Neck: No thyroid mass present.    Cardiovascular:  Normal rate.             Pulmonary/Chest: Effort normal. No respiratory distress.        Abdominal: Soft. There is no abdominal tenderness.             Musculoskeletal: Normal range of motion.      Right lower leg: No edema.      Left lower leg: No edema.       Neurological: She is alert.   No gross lesions noted.    Skin: Skin is warm and dry.    Psychiatric: She has a normal mood and affect. Her speech is normal and behavior is normal. Mood normal.      Recent Laboratory Results:    Pap smear from 1/14/2025 noted no cervical cell abnormalities and was HPV negative     Pathologic Diagnosis ENDOMETRIUM, BIOPSY:  - " Proliferative endometrium.  - No hyperplasia, atypia, or malignancy.       Results    Collected Updated Procedure    01/17/2025 1241 01/17/2025 2223 Estradiol [[id number]]   Blood    Component Value Units   Estradiol 51  pg/mL          01/17/2025 1241 01/23/2025 1538 Testosterone Panel [[id number]]   (Abnormal)   Blood    Component Value Units   Testosterone 90 High   ng/dL   Testosterone, Free 9.3 High   pg/mL   Testosterone, Bioavailable 19.6 High   ng/dL   Sex Hormone Binding Globulin 39  nmol/L   Albumin 4.6  g/dL          01/17/2025 1241 01/17/2025 2223 Follicle Stimulating Hormone [[id number]]   Blood    Component Value Units   Follicle Stimulating Hormone 11.10  mIU/mL          01/17/2025 1241 01/17/2025 2223 Luteinizing Hormone [[id number]]   Blood    Component Value Units   LH 6.1  mIU/mL          01/17/2025 1241 01/17/2025 2224 Progesterone [[id number]]   Blood    Component Value Units   Progesterone 11.4  ng/mL          01/14/2025 1619 01/16/2025 1821 Vaginosis Screen by DNA Probe [[id number]]    Genital    Component Value   Bacterial vaginosis DNA Not Detected   Candida sp Not Detected    Candida glabrata/krusei Not Detected   Trichomonas vaginalis Not Detected              Recent Radiology Results:    1/24/2025 Routine     Narrative & Impression  EXAMINATION:  US PELVIS COMP WITH TRANSVAG NON-OB (XPD)     CLINICAL HISTORY:  Asymptomatic menopausal state     FINDINGS:  Uterus:     Size: 8 x 4 x 6 cm     Masses: None     Endometrium: Normal in this post menopausal patient, measuring 5 mm.     Right ovary:     Right ovary not visualized with bowel gas shadowing in the adnexa     Left ovary:     Left ovary not visualized with bowel gas shadowing in the adnexa     Free Fluid:     None.     Impression:     Unremarkable uterus and endometrium.  Nonvisualized ovaries.    Final Pathologic Diagnosis ENDOMETRIUM, BIOPSY:  - Proliferative endometrium.  - No hyperplasia, atypia, or malignancy.     Assessment:         1. PMB (postmenopausal bleeding)    2. Menopausal symptoms         Plan:      PMB (postmenopausal bleeding)    Menopausal symptoms  -     estradioL (ESTRACE) 1 MG tablet; Take 1 tablet (1 mg total) by mouth once daily.  Dispense: 90 tablet; Refill: 3  -     medroxyPROGESTERone (PROVERA) 10 MG tablet; Take 1 tablet (10 mg total) by mouth once daily. for 10 days  Dispense: 10 tablet; Refill: 0        Follow up in about 3 months (around 5/17/2025) for F/U on today's evaluation, as needed / for any GYN related issues.     The above was reviewed and discussed with the patient and her significant other.    We discussed the reassuring benign results on her recent endometrial biopsy.    We discussed the potential role of her pellet therapy in her abnormal bleeding.    At this time the patient was reassured by the above findings but is beginning to once again have menopausal type issues.  Options reviewed and at this time the patient has elected to initiate oral hormone replacement therapy via estradiol 1 mg and micronized progesterone 100 mg.  Prior to initiating the micronized progesterone the patient will take Provera 10 mg daily for 10 days.  The pros, cons, risks, benefits, alternatives and indications of the medication(s) prescribed, as well as appropriate use and potential side effects were discussed.  We discussed issues and relevant risks associated with the medicine prescribed.  Oncologic and cardiovascular issues associated hormone replacement therapy were discussed.  We will base further evaluation and/or treatment on the patient's response to medical intervention.    The patient's questions were answered, and she is in agreement with the current plan.     Hemal Marcano MD  Department OBGYN [facility]

## 2025-02-24 ENCOUNTER — TELEPHONE (OUTPATIENT)
Dept: OBSTETRICS AND GYNECOLOGY | Facility: CLINIC | Age: 55
End: 2025-02-24
Payer: COMMERCIAL

## 2025-02-24 ENCOUNTER — PATIENT MESSAGE (OUTPATIENT)
Dept: OBSTETRICS AND GYNECOLOGY | Facility: CLINIC | Age: 55
End: 2025-02-24
Payer: COMMERCIAL

## 2025-02-24 DIAGNOSIS — N76.0 VULVOVAGINITIS: Primary | ICD-10-CM

## 2025-02-24 PROBLEM — R07.9 CHEST PAIN: Status: RESOLVED | Noted: 2024-02-04 | Resolved: 2025-02-24

## 2025-02-24 RX ORDER — CLOTRIMAZOLE AND BETAMETHASONE DIPROPIONATE 10; .64 MG/G; MG/G
CREAM TOPICAL 2 TIMES DAILY
Qty: 45 G | Refills: 1 | Status: SHIPPED | OUTPATIENT
Start: 2025-02-24

## 2025-02-24 RX ORDER — FLUCONAZOLE 150 MG/1
150 TABLET ORAL DAILY
Qty: 1 TABLET | Refills: 1 | Status: SHIPPED | OUTPATIENT
Start: 2025-02-24

## 2025-03-06 ENCOUNTER — TELEPHONE (OUTPATIENT)
Dept: OBSTETRICS AND GYNECOLOGY | Facility: CLINIC | Age: 55
End: 2025-03-06
Payer: COMMERCIAL

## 2025-03-06 NOTE — TELEPHONE ENCOUNTER
Contacted pt and notified of providers recommendation. Pt scheduled appt 03/13/25 and she voiced understanding.

## 2025-03-06 NOTE — TELEPHONE ENCOUNTER
----- Message from Hemal Marcano MD sent at 3/6/2025 11:36 AM CST -----  Please contact this patient and let her know that since her bleeding continued to a significant as stent I would like to see her in the office to review and discuss a game plan rather than just wait until May.  If she has any questions or concerns please let me know.

## 2025-03-13 ENCOUNTER — OFFICE VISIT (OUTPATIENT)
Dept: OBSTETRICS AND GYNECOLOGY | Facility: CLINIC | Age: 55
End: 2025-03-13
Payer: COMMERCIAL

## 2025-03-13 VITALS
BODY MASS INDEX: 34.44 KG/M2 | SYSTOLIC BLOOD PRESSURE: 128 MMHG | WEIGHT: 214.31 LBS | HEIGHT: 66 IN | DIASTOLIC BLOOD PRESSURE: 78 MMHG

## 2025-03-13 DIAGNOSIS — N95.0 PMB (POSTMENOPAUSAL BLEEDING): Primary | ICD-10-CM

## 2025-03-13 PROCEDURE — 1159F MED LIST DOCD IN RCRD: CPT | Mod: CPTII,S$GLB,, | Performed by: OBSTETRICS & GYNECOLOGY

## 2025-03-13 PROCEDURE — 3074F SYST BP LT 130 MM HG: CPT | Mod: CPTII,S$GLB,, | Performed by: OBSTETRICS & GYNECOLOGY

## 2025-03-13 PROCEDURE — 99999 PR PBB SHADOW E&M-EST. PATIENT-LVL IV: CPT | Mod: PBBFAC,,, | Performed by: OBSTETRICS & GYNECOLOGY

## 2025-03-13 PROCEDURE — 99214 OFFICE O/P EST MOD 30 MIN: CPT | Mod: S$GLB,,, | Performed by: OBSTETRICS & GYNECOLOGY

## 2025-03-13 PROCEDURE — 3008F BODY MASS INDEX DOCD: CPT | Mod: CPTII,S$GLB,, | Performed by: OBSTETRICS & GYNECOLOGY

## 2025-03-13 PROCEDURE — 3078F DIAST BP <80 MM HG: CPT | Mod: CPTII,S$GLB,, | Performed by: OBSTETRICS & GYNECOLOGY

## 2025-03-13 RX ORDER — MUPIROCIN 20 MG/G
OINTMENT TOPICAL
OUTPATIENT
Start: 2025-03-13

## 2025-03-13 RX ORDER — SODIUM CHLORIDE 9 MG/ML
INJECTION, SOLUTION INTRAVENOUS CONTINUOUS
OUTPATIENT
Start: 2025-03-13

## 2025-03-13 NOTE — PROGRESS NOTES
[facility] Note      Pertinent Med & GYN Problem List    Cochlear implant    SUBJECTIVE     Chief Complaint   Patient presents with    Vaginal Bleeding       History and Physical:  Patient's last menstrual period was 2020.    HRT:  Prometrium 200 mg p.o. q.h.s. and pellet therapy last placed 2024    Date: 2025    Corinne Miller Wingerter is a 55 y.o.  who presents today for her routine annual GYN exam.     Gynecologic issues:  The patient reports a vaginal discharge with itching present for approximately four days which has not responded to over-the-counter medication.    She denies any pelvic pain  She reports issues with postmenopausal bleeding to some extent daily over the last six months.    Pap smear history:  No history of abnormal Pap smears.  Last Pap smear: 2023    Mammogram: Up-to-date   Colon cancer screening:  Up-to-date   Personal or family history of bleeding or blood clotting disorders:  Negative     Family history:  Breast cancer:  Negative  Colon cancer:  Negative   Gyn related cancer:  Negative     Date: 2025    The patient presents today for follow-up.  She was last seen as above, at which time lab work and ultrasound were obtained in light of the patient's bleeding..    She presents to discuss the results of her laboratory evaluation / radiologic evaluation as well as further potential evaluation and treatment options.     On further discussion today the patient now reports being on hormonal pellets placed in 2024 as well as oral micronized progesterone 200 mg two pills p.o. Q q.h.s..  She continues episodes of bleeding which tends to come and go as well as occasional episodes of right lower quadrant pain.    Date: 2025    The patient presents today for follow-up.  She was last seen as above, at which time in light of her abnormal bleeding an endometrial biopsy was performed.    She presents to discuss  the results of her laboratory evaluation as well as further potential evaluation and treatment options.    Final Pathologic Diagnosis ENDOMETRIUM, BIOPSY:  - Proliferative endometrium.  - No hyperplasia, atypia, or malignancy.        Date: 3/13/2025    The patient presents today due to continued issues with postmenopausal bleeding.    She had previously been diagnosed as menopausal and started on pellet hormonal therapy.    Continued bleeding was noted and as above endometrial biopsy which was benign was obtained.    At the patient's last evaluation she was started on estradiol 1 mg and micronized progesterone 200 mg daily.    Despite the endometrial biopsy and re-initiation of hormone therapy the patient has continued to have irregular bleeding.    She reports that her bleeding predates her initiation of pellet hormone replacement therapy.    Allergies:   Review of patient's allergies indicates:   Allergen Reactions    Milk containing products (dairy)        Past Medical History:   Diagnosis Date    Cochlear implant in place     Hearing impaired        Past Surgical History:   Procedure Laterality Date    IMPLANTATION OF COCHLEAR PROSTHESIS      PERCUTANEOUS TENOTOMY Right 09/29/2021    Procedure: TENOTOMY, PERCUTANEOUS HIP;  Surgeon: Oralia Finn DO;  Location: [facility] OR;  Service: Orthopedics;  Laterality: Right;  will bring own crutches/walker       MEDS:   Current Outpatient Medications:     clindamycin phosphate 1% (CLINDAGEL) 1 % gel, Apply ACNE BID, Disp: 60 g, Rfl: 11    clotrimazole-betamethasone 1-0.05% (LOTRISONE) cream, Apply topically 2 (two) times daily. PRN itch, Disp: 45 g, Rfl: 1    cyclobenzaprine (FLEXERIL) 5 MG tablet, Take 5 mg by mouth every evening., Disp: , Rfl:     estradioL (ESTRACE) 1 MG tablet, Take 1 tablet (1 mg total) by mouth once daily., Disp: 90 tablet, Rfl: 3    fluconazole (DIFLUCAN) 150 MG Tab, Take 1 tablet (150 mg total) by mouth once daily., Disp: 1 tablet, Rfl: 1     "hydroCHLOROthiazide (HYDRODIURIL) 25 MG tablet, Take 25 mg by mouth once daily., Disp: , Rfl:     meloxicam (MOBIC) 15 MG tablet, Take 15 mg by mouth once daily., Disp: , Rfl:     progesterone (PROMETRIUM) 200 MG capsule, Take 400 mg by mouth every evening., Disp: , Rfl:     clobetasoL (TEMOVATE) 0.05 % cream, Aaa bid x 3 wks, tk 1 wk off FOR HAND ECZEMA/CONTACT DERMATITIS (Patient not taking: Reported on 3/13/2025), Disp: 60 g, Rfl: 2    medroxyPROGESTERone (PROVERA) 10 MG tablet, Take 1 tablet (10 mg total) by mouth once daily. for 10 days, Disp: 10 tablet, Rfl: 0     OB History          3    Para   3    Term   3            AB        Living   3         SAB        IAB        Ectopic        Multiple        Live Births   3           Obstetric Comments   Vaginal delivery x 3               Age of Menarche:16  Age at first pregnancy:29   Age at first live birth:30  Number of months breastfeeding:    Age at Menopause:47   Comments:       Social History     Tobacco Use    Smoking status: Never    Smokeless tobacco: Never   Substance Use Topics    Alcohol use: Yes    Drug use: Never       Family History   Problem Relation Name Age of Onset    Skin cancer Mother         Past medical and surgical history reviewed.   I have reviewed the patient's medical history in detail and updated the computerized patient record.    Review of Systems (at today's evaluation)  Review of Systems   Constitutional:  Negative for fever and unexpected weight change.   Respiratory:  Negative for cough and shortness of breath.    Cardiovascular:  Negative for chest pain.   Gastrointestinal:  Negative for constipation, diarrhea and nausea.   Genitourinary:  Negative for dysuria and frequency.          GYN AS PER HPI   Musculoskeletal: Negative.    Skin: Negative.    Neurological: Negative.         Physical Exam:   /78 (BP Location: Right arm, Patient Position: Sitting)   Ht 5' 6" (1.676 m)   Wt 97.2 kg (214 lb 4.6 oz)   LMP " 08/29/2020   BMI 34.59 kg/m²     Physical Exam:   Constitutional: She appears well-developed and well-nourished. No distress.    HENT:   Head: Normocephalic.     Neck: No thyroid mass present.    Cardiovascular:  Normal rate.             Pulmonary/Chest: Effort normal. No respiratory distress.        Abdominal: Soft. There is no abdominal tenderness.             Musculoskeletal: Normal range of motion.      Right lower leg: No edema.      Left lower leg: No edema.       Neurological: She is alert.   No gross lesions noted.    Skin: Skin is warm and dry.    Psychiatric: She has a normal mood and affect. Her speech is normal and behavior is normal. Mood normal.      Recent Laboratory Results:    Pap smear from 1/14/2025 noted no cervical cell abnormalities and was HPV negative     Pathologic Diagnosis ENDOMETRIUM, BIOPSY:  - Proliferative endometrium.  - No hyperplasia, atypia, or malignancy.       Results    Collected Updated Procedure    01/17/2025 1241 01/17/2025 2223 Estradiol [[id number]]   Blood    Component Value Units   Estradiol 51  pg/mL          01/17/2025 1241 01/23/2025 1538 Testosterone Panel [[id number]]   (Abnormal)   Blood    Component Value Units   Testosterone 90 High   ng/dL   Testosterone, Free 9.3 High   pg/mL   Testosterone, Bioavailable 19.6 High   ng/dL   Sex Hormone Binding Globulin 39  nmol/L   Albumin 4.6  g/dL          01/17/2025 1241 01/17/2025 2223 Follicle Stimulating Hormone [[id number]]   Blood    Component Value Units   Follicle Stimulating Hormone 11.10  mIU/mL          01/17/2025 1241 01/17/2025 2223 Luteinizing Hormone [[id number]]   Blood    Component Value Units   LH 6.1  mIU/mL          01/17/2025 1241 01/17/2025 2224 Progesterone [[id number]]   Blood    Component Value Units   Progesterone 11.4  ng/mL          01/14/2025 1619 01/16/2025 1821 Vaginosis Screen by DNA Probe [[id number]]    Genital    Component Value   Bacterial vaginosis DNA Not Detected   Candida sp Not  Detected    Candida glabrata/krusei Not Detected   Trichomonas vaginalis Not Detected            Recent Radiology Results:    1/24/2025 Routine     Narrative & Impression  EXAMINATION:  US PELVIS COMP WITH TRANSVAG NON-OB (XPD)     CLINICAL HISTORY:  Asymptomatic menopausal state     FINDINGS:  Uterus:     Size: 8 x 4 x 6 cm     Masses: None     Endometrium: Normal in this post menopausal patient, measuring 5 mm.     Right ovary:     Right ovary not visualized with bowel gas shadowing in the adnexa     Left ovary:     Left ovary not visualized with bowel gas shadowing in the adnexa     Free Fluid:     None.     Impression:     Unremarkable uterus and endometrium.  Nonvisualized ovaries.    Final Pathologic Diagnosis ENDOMETRIUM, BIOPSY:  - Proliferative endometrium.  - No hyperplasia, atypia, or malignancy.     Assessment:        1. PMB (postmenopausal bleeding)      Plan:      PMB (postmenopausal bleeding)  -     Place in Outpatient; Standing  -     Full code; Standing  -     Vital signs; Standing  -     Bed rest with bathroom privileges; Standing  -     Insert peripheral IV; Standing  -     POCT glucose; Standing  -     Notify physician if BS > 180 for hysterectomy patients; Standing  -     Chlorhexidine (CHG) 2% Wipes; Standing  -     Notify Physician/Vital Signs Parameters Urine output less than 0.5mL/kg/hr (with indwelling catheter) or 30 mL/hr (without indwelling catheter) or blood glucose greater than 200 mg/dL; Standing  -     Notify physician; Standing  -     Notify Physician - Potential Need of Opioid Reversal; Standing  -     Diet NPO; Standing  -     Case Request Operating Room: HYSTEROSCOPY, WITH DILATION AND CURETTAGE OF UTERUS AND OTHER INDICATED PROCEDURES  -     Place sequential compression device; Standing    Other orders  -     0.9% NaCl infusion  -     IP VTE LOW RISK PATIENT; Standing  -     mupirocin 2 % ointment      Follow up for As needed or 2 weeks following surgery.       The above was  reviewed and discussed with the patient.    We discussed the benign findings on the recent endometrial biopsy as well as her continued postmenopausal bleeding despite attempts at medical management.    Conservative, medical (increasing HRT to a low-dose OCP), and surgical interventions (hysteroscopy, dilation and curettage/hysterectomy) were discussed, and the patient would like to proceed with surgical intervention.  She will be scheduled for a HYSTEROSCOPY, DILATION AND CURETTAGE AND OTHER INDICATED PROCEDURES    The pros, cons, risks, benefits, alternatives, and indications of the surgical procedure were discussed in detail with the patient.  We discussed the possibility of allergic reactions, bleeding, infection damage to surrounding structures (cervix, uterus, bladder, ureters, bowel) and other abdominal pelvic organs.  Issues unique to this procedure were discussed.    The patient's questions regarding above were answered and she agrees with this plan.  The patient was provided with the informed consent form and given times reviewed the form.  Questions were answered and consent was obtained    Hemal Marcano MD  Department OBGYN [facility]

## 2025-03-18 ENCOUNTER — PATIENT MESSAGE (OUTPATIENT)
Dept: OBSTETRICS AND GYNECOLOGY | Facility: CLINIC | Age: 55
End: 2025-03-18
Payer: COMMERCIAL

## 2025-03-18 RX ORDER — PROGESTERONE 200 MG/1
400 CAPSULE ORAL NIGHTLY
Qty: 180 CAPSULE | Refills: 1 | Status: SHIPPED | OUTPATIENT
Start: 2025-03-18

## 2025-05-13 ENCOUNTER — PATIENT MESSAGE (OUTPATIENT)
Dept: OBSTETRICS AND GYNECOLOGY | Facility: CLINIC | Age: 55
End: 2025-05-13
Payer: COMMERCIAL

## 2025-05-13 DIAGNOSIS — N76.0 VULVOVAGINITIS: ICD-10-CM

## 2025-05-14 RX ORDER — FLUCONAZOLE 150 MG/1
150 TABLET ORAL DAILY
Qty: 1 TABLET | Refills: 1 | Status: SHIPPED | OUTPATIENT
Start: 2025-05-14

## 2025-06-18 ENCOUNTER — OFFICE VISIT (OUTPATIENT)
Dept: OBSTETRICS AND GYNECOLOGY | Facility: CLINIC | Age: 55
End: 2025-06-18
Payer: COMMERCIAL

## 2025-06-18 VITALS
WEIGHT: 222.69 LBS | BODY MASS INDEX: 35.94 KG/M2 | SYSTOLIC BLOOD PRESSURE: 136 MMHG | DIASTOLIC BLOOD PRESSURE: 82 MMHG

## 2025-06-18 DIAGNOSIS — N95.0 PMB (POSTMENOPAUSAL BLEEDING): Primary | ICD-10-CM

## 2025-06-18 PROCEDURE — 99214 OFFICE O/P EST MOD 30 MIN: CPT | Mod: S$GLB,,, | Performed by: OBSTETRICS & GYNECOLOGY

## 2025-06-18 PROCEDURE — 99999 PR PBB SHADOW E&M-EST. PATIENT-LVL II: CPT | Mod: PBBFAC,,, | Performed by: OBSTETRICS & GYNECOLOGY

## 2025-06-18 PROCEDURE — 3079F DIAST BP 80-89 MM HG: CPT | Mod: CPTII,S$GLB,, | Performed by: OBSTETRICS & GYNECOLOGY

## 2025-06-18 PROCEDURE — 3075F SYST BP GE 130 - 139MM HG: CPT | Mod: CPTII,S$GLB,, | Performed by: OBSTETRICS & GYNECOLOGY

## 2025-06-18 PROCEDURE — 3008F BODY MASS INDEX DOCD: CPT | Mod: CPTII,S$GLB,, | Performed by: OBSTETRICS & GYNECOLOGY

## 2025-06-18 NOTE — PROGRESS NOTES
[facility] Note      Pertinent Med & GYN Problem List    Cochlear implant    Personal or family history of bleeding or blood clotting disorders:  Negative     Family history:  Breast cancer:  Negative  Colon cancer:  Negative   Gyn related cancer:  Negative     SUBJECTIVE     Chief Complaint   Patient presents with    Vaginal Bleeding       History and Physical:  Patient's last menstrual period was 2020.    HRT:  Prometrium 200 mg p.o. q.h.s. and estradiol 1 mg    Date: 2025    Corinne Miller Wingerter is a 55 y.o.      Final Pathologic Diagnosis ENDOMETRIUM, BIOPSY:  - Proliferative endometrium.  - No hyperplasia, atypia, or malignancy.        The patient has been followed for some time due to persistent postmenopausal bleeding.  Benign pathology as above was obtained on endometrial biopsy.  She presents today due to continued issues with postmenopausal bleeding.    The bleeding tends to come and go.  She is currently on estradiol 1 mg and micronized progesterone 200 mg    She would like to discuss proceeding with hysteroscopy and dilation and curettage.    She is scheduled for knee surgery on 2025.    Allergies:   Review of patient's allergies indicates:   Allergen Reactions    Milk containing products (dairy)        Past Medical History:   Diagnosis Date    Cochlear implant in place     Hearing impaired        Past Surgical History:   Procedure Laterality Date    IMPLANTATION OF COCHLEAR PROSTHESIS      PERCUTANEOUS TENOTOMY Right 2021    Procedure: TENOTOMY, PERCUTANEOUS HIP;  Surgeon: Oralia Finn DO;  Location: [facility] OR;  Service: Orthopedics;  Laterality: Right;  will bring own crutches/walker       MEDS:   Current Outpatient Medications:     clindamycin phosphate 1% (CLINDAGEL) 1 % gel, Apply ACNE BID, Disp: 60 g, Rfl: 11    clotrimazole-betamethasone 1-0.05% (LOTRISONE) cream, Apply topically 2 (two) times daily. PRN itch, Disp: 45 g, Rfl:  1    cyclobenzaprine (FLEXERIL) 5 MG tablet, Take 5 mg by mouth every evening., Disp: , Rfl:     estradioL (ESTRACE) 1 MG tablet, Take 1 tablet (1 mg total) by mouth once daily., Disp: 90 tablet, Rfl: 3    fluconazole (DIFLUCAN) 150 MG Tab, Take 1 tablet (150 mg total) by mouth once daily., Disp: 1 tablet, Rfl: 1    meloxicam (MOBIC) 15 MG tablet, Take 15 mg by mouth once daily., Disp: , Rfl:     progesterone (PROMETRIUM) 200 MG capsule, Take 2 capsules (400 mg total) by mouth every evening., Disp: 180 capsule, Rfl: 1    hydroCHLOROthiazide (HYDRODIURIL) 25 MG tablet, Take 25 mg by mouth once daily., Disp: , Rfl:      OB History          3    Para   3    Term   3            AB        Living   3         SAB        IAB        Ectopic        Multiple        Live Births   3           Obstetric Comments   Vaginal delivery x 3               Age of Menarche:16  Age at first pregnancy:29   Age at first live birth:30  Number of months breastfeeding:    Age at Menopause:47   Comments:       Social History     Tobacco Use    Smoking status: Never    Smokeless tobacco: Never   Substance Use Topics    Alcohol use: Yes    Drug use: Never       Family History   Problem Relation Name Age of Onset    Skin cancer Mother         Past medical and surgical history reviewed.   I have reviewed the patient's medical history in detail and updated the computerized patient record.    Review of Systems (at today's evaluation)  Review of Systems   Constitutional:  Negative for fever and unexpected weight change.   Respiratory:  Negative for cough and shortness of breath.    Cardiovascular:  Negative for chest pain.   Gastrointestinal:  Negative for constipation, diarrhea and nausea.   Genitourinary:  Negative for dysuria and frequency.          GYN AS PER HPI   Musculoskeletal: Negative.    Skin: Negative.    Neurological: Negative.         Physical Exam:   /82 (BP Location: Left arm, Patient Position: Sitting)   Wt 101 kg  (222 lb 10.6 oz)   LMP 08/29/2020   BMI 35.94 kg/m²     Physical Exam:   Constitutional: She appears well-developed and well-nourished. No distress.    HENT:   Head: Normocephalic.     Neck: No thyroid mass present.    Cardiovascular:  Normal rate.             Pulmonary/Chest: Effort normal. No respiratory distress.        Abdominal: Soft. There is no abdominal tenderness.             Musculoskeletal: Normal range of motion.      Right lower leg: No edema.      Left lower leg: No edema.       Neurological: She is alert.   No gross lesions noted.    Skin: Skin is warm and dry.    Psychiatric: She has a normal mood and affect. Her speech is normal and behavior is normal. Mood normal.      Recent Laboratory Results:    Pap smear from 1/14/2025 noted no cervical cell abnormalities and was HPV negative     Pathologic Diagnosis ENDOMETRIUM, BIOPSY:  - Proliferative endometrium.  - No hyperplasia, atypia, or malignancy.       Results    Collected Updated Procedure    01/17/2025 1241 01/17/2025 2223 Estradiol [[id number]]   Blood    Component Value Units   Estradiol 51  pg/mL          01/17/2025 1241 01/23/2025 1538 Testosterone Panel [[id number]]   (Abnormal)   Blood    Component Value Units   Testosterone 90 High   ng/dL   Testosterone, Free 9.3 High   pg/mL   Testosterone, Bioavailable 19.6 High   ng/dL   Sex Hormone Binding Globulin 39  nmol/L   Albumin 4.6  g/dL          01/17/2025 1241 01/17/2025 2223 Follicle Stimulating Hormone [[id number]]   Blood    Component Value Units   Follicle Stimulating Hormone 11.10  mIU/mL          01/17/2025 1241 01/17/2025 2223 Luteinizing Hormone [[id number]]   Blood    Component Value Units   LH 6.1  mIU/mL          01/17/2025 1241 01/17/2025 2224 Progesterone [[id number]]   Blood    Component Value Units   Progesterone 11.4  ng/mL          01/14/2025 1619 01/16/2025 1821 Vaginosis Screen by DNA Probe [[id number]]    Genital    Component Value   Bacterial vaginosis DNA Not Detected    Candida sp Not Detected    Candida glabrata/krusei Not Detected   Trichomonas vaginalis Not Detected            Recent Radiology Results:    1/24/2025 Routine     Narrative & Impression  EXAMINATION:  US PELVIS COMP WITH TRANSVAG NON-OB (XPD)     CLINICAL HISTORY:  Asymptomatic menopausal state     FINDINGS:  Uterus:     Size: 8 x 4 x 6 cm     Masses: None     Endometrium: Normal in this post menopausal patient, measuring 5 mm.     Right ovary:     Right ovary not visualized with bowel gas shadowing in the adnexa     Left ovary:     Left ovary not visualized with bowel gas shadowing in the adnexa     Free Fluid:     None.     Impression:     Unremarkable uterus and endometrium.  Nonvisualized ovaries.    Final Pathologic Diagnosis ENDOMETRIUM, BIOPSY:  - Proliferative endometrium.  - No hyperplasia, atypia, or malignancy.     Assessment:        1. PMB (postmenopausal bleeding)        Plan:      PMB (postmenopausal bleeding)      Follow up for As needed or 2 weeks following surgery.       The above was reviewed and discussed with the patient.  We reviewed her previous benign endometrial biopsy.    We once again discussed conservative, medical (increasing HRT to a low-dose OCP), and surgical interventions.  The patient had previously elected to proceed with hysteroscopy and D&C and would now like to schedule this planned surgery.  We will contact anesthesia and discuss recommendations for timing between her orthopedic surgery and D&C hysteroscopy.  We will plan to schedule once we have discussed with anesthesia.      The patient's questions regarding the above were answered and she is in agreement with the current plan.    Hemal Marcano MD  Department OBGYN [facility]

## 2025-06-25 ENCOUNTER — TELEPHONE (OUTPATIENT)
Dept: OBSTETRICS AND GYNECOLOGY | Facility: CLINIC | Age: 55
End: 2025-06-25
Payer: COMMERCIAL

## 2025-06-25 ENCOUNTER — PATIENT MESSAGE (OUTPATIENT)
Dept: OBSTETRICS AND GYNECOLOGY | Facility: CLINIC | Age: 55
End: 2025-06-25
Payer: COMMERCIAL

## 2025-06-25 NOTE — TELEPHONE ENCOUNTER
Spoke with  and notified to ask orthopedics tomorrow how long she needs to wait to manipulate knee for D&C. Advised to contact us once they know and we can schedule her procedure. Cristobal voiced understanding.

## 2025-06-25 NOTE — TELEPHONE ENCOUNTER
Spoke with  and he asked pt while on phone if she planned to have D&C. Pt states she does want to move forward with scheduling the D&C. However, she is having a knee surgery tomorrow. Pt not sure how long she needs to wait after having knee surgery to schedule D&C.   77

## 2025-06-25 NOTE — TELEPHONE ENCOUNTER
Left voicemail to contact office in regard to message. Did add to ask ortho tomorrow as to when it will be ok for pt to manipulate her leg for surgery.

## 2025-06-25 NOTE — TELEPHONE ENCOUNTER
----- Message from Hemal Marcano MD sent at 6/22/2025 12:48 PM CDT -----  Regarding: Surgery  Please call this patient and clarify that she is good to go with me scheduling her D&C in the near future.  She had previously been scheduled but held off on surgery due to the issues.  I saw her on the 18th and I want to be clear that were okay to proceed with surgery.

## 2025-09-04 ENCOUNTER — PATIENT MESSAGE (OUTPATIENT)
Dept: OBSTETRICS AND GYNECOLOGY | Facility: CLINIC | Age: 55
End: 2025-09-04
Payer: COMMERCIAL

## 2025-09-04 DIAGNOSIS — N76.0 VULVOVAGINITIS: ICD-10-CM

## 2025-09-05 RX ORDER — FLUCONAZOLE 150 MG/1
150 TABLET ORAL DAILY
Qty: 1 TABLET | Refills: 1 | Status: SHIPPED | OUTPATIENT
Start: 2025-09-05

## (undated) DEVICE — APPLICATOR CHLORAPREP CLR 10.5

## (undated) DEVICE — DRAPE MINOR PROCEDURE

## (undated) DEVICE — SOL NACL 0.9% INJ 500ML BG

## (undated) DEVICE — DRESSING TRANS 4X4 TEGADERM

## (undated) DEVICE — NDL SPINAL 18GX3.5 SPINOCAN

## (undated) DEVICE — NDL SAFETY 25G X 1.5 ECLIPSE

## (undated) DEVICE — NDL SPINAL 25GX3.5 SPINOCAN

## (undated) DEVICE — GLOVE SURG ULTRA TOUCH 7

## (undated) DEVICE — SYR 10CC LUER LOCK

## (undated) DEVICE — SYS LABEL CORRECT MED

## (undated) DEVICE — Device

## (undated) DEVICE — NDL HYPODERMIC BLUNT 18G 1.5IN